# Patient Record
Sex: FEMALE | Race: OTHER | NOT HISPANIC OR LATINO
[De-identification: names, ages, dates, MRNs, and addresses within clinical notes are randomized per-mention and may not be internally consistent; named-entity substitution may affect disease eponyms.]

---

## 2017-05-03 ENCOUNTER — APPOINTMENT (OUTPATIENT)
Dept: VASCULAR SURGERY | Facility: CLINIC | Age: 82
End: 2017-05-03

## 2017-05-03 VITALS — SYSTOLIC BLOOD PRESSURE: 215 MMHG | HEART RATE: 65 BPM | DIASTOLIC BLOOD PRESSURE: 96 MMHG | OXYGEN SATURATION: 96 %

## 2017-05-03 DIAGNOSIS — M79.89 OTHER SPECIFIED SOFT TISSUE DISORDERS: ICD-10-CM

## 2017-05-05 PROBLEM — M79.89 LEG SWELLING: Status: ACTIVE | Noted: 2017-05-05

## 2018-07-20 ENCOUNTER — APPOINTMENT (OUTPATIENT)
Dept: VASCULAR SURGERY | Facility: CLINIC | Age: 83
End: 2018-07-20

## 2018-08-03 ENCOUNTER — APPOINTMENT (OUTPATIENT)
Dept: VASCULAR SURGERY | Facility: CLINIC | Age: 83
End: 2018-08-03

## 2018-11-07 ENCOUNTER — APPOINTMENT (OUTPATIENT)
Dept: VASCULAR SURGERY | Facility: CLINIC | Age: 83
End: 2018-11-07

## 2018-11-09 ENCOUNTER — APPOINTMENT (OUTPATIENT)
Dept: VASCULAR SURGERY | Facility: CLINIC | Age: 83
End: 2018-11-09
Payer: MEDICARE

## 2018-11-09 PROCEDURE — 93970 EXTREMITY STUDY: CPT

## 2018-11-09 PROCEDURE — 99214 OFFICE O/P EST MOD 30 MIN: CPT

## 2019-02-01 ENCOUNTER — APPOINTMENT (OUTPATIENT)
Dept: VASCULAR SURGERY | Facility: CLINIC | Age: 84
End: 2019-02-01

## 2019-12-16 ENCOUNTER — APPOINTMENT (OUTPATIENT)
Dept: PAIN MANAGEMENT | Facility: CLINIC | Age: 84
End: 2019-12-16

## 2020-08-18 ENCOUNTER — APPOINTMENT (OUTPATIENT)
Dept: ORTHOPEDIC SURGERY | Facility: CLINIC | Age: 85
End: 2020-08-18
Payer: MEDICARE

## 2020-08-18 DIAGNOSIS — S76.012A STRAIN OF MUSCLE, FASCIA AND TENDON OF LEFT HIP, INITIAL ENCOUNTER: ICD-10-CM

## 2020-08-18 PROCEDURE — 99203 OFFICE O/P NEW LOW 30 MIN: CPT

## 2020-08-21 NOTE — PHYSICAL EXAM
[LE] : 5/5 motor strength in bilateral lower extremities [PT] : posterior tibial 2+ and symmetric bilaterally [DP] : dorsalis pedis 2+ and symmetric bilaterally [de-identified] : Incisions from IMN are well healed, no erythema or drainage\par Tenderness over greater troch and medius tendon\par Antalgaic gait, subtle trendelenburg\par Ambulates with a walker but able to walk short distances without\par Pain with single leg stance\par Mildly positive log roll\par \par  [de-identified] : Reviewed outside xrays and MRI of the left hip. \par Xrays show short IMN in place with no obvious fracture lines.  Old fracture appears healed.  No collapse of femoral head.\par MRI shows small area of AVN in femoral head, no fracture lines or edema.

## 2020-08-21 NOTE — HISTORY OF PRESENT ILLNESS
[de-identified] : 89yoF with left hip pain.  About a year ago, fell by accident and fractured her hip.  Fixed with a short IMN.  Healed fine but continued to have left hip pain.  Her original surgeon thought she might have sciatica and sent her to see a pain specialist who could do injections for sciatica.  She has gotten multiple rounds of injections and PT with no pain relief.  Her pain physician ordered an MRI of her hip instead and found a small area of AVN in the femoral head without any evidence of collapse.  Referred to see us because of the AVN.  Has significant groin pain as well as lateral hip pain.  Still using a walker for ambulation.  Pain with any ambulation at all.

## 2020-08-21 NOTE — ASSESSMENT
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  We discussed the proposed procedure with her and her daughter and answered all questions.  Discussed risks/benefits and expected outcomes, as well as post-op rehab.  No PT until 5 weeks postop.  She should expect to stay overnight in the hospital.\par \par CASE will benefit from the proposed procedure, she has failed a conservative treatment plan of supervised physical therapy, anti-inflammatory medications, and activity modification. She continues to have pain impacting her ability to perform ADL's and has a decreasing QoL.  We will schedule this for the earliest mutually convenient time after the appropriate pre-op laboratory tests and clearances are obtained.  All questions were answered and a thorough explanation of RBA were given.\par \par All medical record entries made by the PA/Scribe/Fellow are at my, Dr. Cordell Rodríguez's direction and personally dictated by me on 08/18/2020]. I have reviewed the chart and agree that the record accurately reflects my personal performance of the history, physical exam, assessment, and plan. I have also personally directed reviewed, and agreed with the chart.\par

## 2020-09-02 ENCOUNTER — TRANSCRIPTION ENCOUNTER (OUTPATIENT)
Age: 85
End: 2020-09-02

## 2020-09-02 VITALS
OXYGEN SATURATION: 97 % | SYSTOLIC BLOOD PRESSURE: 143 MMHG | HEART RATE: 68 BPM | DIASTOLIC BLOOD PRESSURE: 77 MMHG | HEIGHT: 61 IN | TEMPERATURE: 98 F | WEIGHT: 112.66 LBS | RESPIRATION RATE: 16 BRPM

## 2020-09-02 RX ORDER — INFLUENZA VIRUS VACCINE 15; 15; 15; 15 UG/.5ML; UG/.5ML; UG/.5ML; UG/.5ML
0.5 SUSPENSION INTRAMUSCULAR ONCE
Refills: 0 | Status: DISCONTINUED | OUTPATIENT
Start: 2020-09-03 | End: 2020-09-04

## 2020-09-02 RX ORDER — IRBESARTAN 75 MG/1
1 TABLET ORAL
Qty: 0 | Refills: 0 | DISCHARGE

## 2020-09-02 RX ORDER — POVIDONE-IODINE 5 %
1 AEROSOL (ML) TOPICAL ONCE
Refills: 0 | Status: COMPLETED | OUTPATIENT
Start: 2020-09-03 | End: 2020-09-03

## 2020-09-02 NOTE — H&P ADULT - HISTORY OF PRESENT ILLNESS
89yo m c/o left hip pain x   Presents today for elective LEFT HIP EDVIN, CORE DECOMPRESSION, BONE GRAFT, REPAIR ABDUCTOR MUSCLE. 88yo m c/o left hip pain x since previous injury in April 2019. Patient had fall with hip fracture treated with IMN. She reports persisting pain at the lateral left hip since then. Describes aching pain radiating down the leg. Ambulating with cane as needed. Denies associated numbness/tingling.   Denies recent illness, fevers, chills.   Presents today for elective left hip removal of hardware, core decompression, abductor repair, bone graft with cells with Dr. Rodríguez.

## 2020-09-02 NOTE — H&P ADULT - NSHPPHYSICALEXAM_GEN_ALL_CORE
GENERAL:  PE:  Decreased ROM secondary to pain. Rest of PE per medical clearance. Gen: 88 y/o female, well nourished, well developed, NAD  MSK: Decreased ROM secondary to pain at the left hip   calves soft, nontender bilaterally   sensation intact to light touch bilateral lower extremities  DP 2+,  brisk capillary refill  EHL/FHL/TA/GS 5/5 bilaterally     Rest of PE per MD clearance

## 2020-09-02 NOTE — H&P ADULT - NSHPLABSRESULTS_GEN_ALL_CORE
preop cbc/bmp/coags/ua wnl per medical clearance  EKG- SR with PAC, LAD  echo non severe MR and AI per medical clearance   Preop chest x-ray wnl per clearance   Covid negative 8/31/20

## 2020-09-02 NOTE — H&P ADULT - PROBLEM SELECTOR PLAN 1
Admit to Orthopaedic Service.  Presents today for elective LEFT HIP EDVIN, CORE DECOMPRESSION, BONE GRAFT, REPAIR ABDUCTOR MUSCLE.  Pt medically stable and cleared for procedure today by Dr. Hernandez.

## 2020-09-03 ENCOUNTER — INPATIENT (INPATIENT)
Facility: HOSPITAL | Age: 85
LOS: 0 days | Discharge: HOME | DRG: 481 | End: 2020-09-04
Attending: ORTHOPAEDIC SURGERY | Admitting: ORTHOPAEDIC SURGERY
Payer: MEDICARE

## 2020-09-03 ENCOUNTER — APPOINTMENT (OUTPATIENT)
Dept: ORTHOPEDIC SURGERY | Facility: HOSPITAL | Age: 85
End: 2020-09-03
Payer: MEDICARE

## 2020-09-03 DIAGNOSIS — Z98.890 OTHER SPECIFIED POSTPROCEDURAL STATES: Chronic | ICD-10-CM

## 2020-09-03 DIAGNOSIS — M25.552 PAIN IN LEFT HIP: ICD-10-CM

## 2020-09-03 DIAGNOSIS — F41.9 ANXIETY DISORDER, UNSPECIFIED: ICD-10-CM

## 2020-09-03 DIAGNOSIS — Z90.49 ACQUIRED ABSENCE OF OTHER SPECIFIED PARTS OF DIGESTIVE TRACT: Chronic | ICD-10-CM

## 2020-09-03 DIAGNOSIS — I10 ESSENTIAL (PRIMARY) HYPERTENSION: ICD-10-CM

## 2020-09-03 LAB
BASOPHILS # BLD AUTO: 0.03 K/UL — SIGNIFICANT CHANGE UP (ref 0–0.2)
BASOPHILS NFR BLD AUTO: 0.3 % — SIGNIFICANT CHANGE UP (ref 0–2)
EOSINOPHIL # BLD AUTO: 0.07 K/UL — SIGNIFICANT CHANGE UP (ref 0–0.5)
EOSINOPHIL NFR BLD AUTO: 0.8 % — SIGNIFICANT CHANGE UP (ref 0–6)
HCT VFR BLD CALC: 31.9 % — LOW (ref 34.5–45)
HGB BLD-MCNC: 10.6 G/DL — LOW (ref 11.5–15.5)
IMM GRANULOCYTES NFR BLD AUTO: 0.4 % — SIGNIFICANT CHANGE UP (ref 0–1.5)
LYMPHOCYTES # BLD AUTO: 1.22 K/UL — SIGNIFICANT CHANGE UP (ref 1–3.3)
LYMPHOCYTES # BLD AUTO: 13.2 % — SIGNIFICANT CHANGE UP (ref 13–44)
MCHC RBC-ENTMCNC: 28.6 PG — SIGNIFICANT CHANGE UP (ref 27–34)
MCHC RBC-ENTMCNC: 33.2 GM/DL — SIGNIFICANT CHANGE UP (ref 32–36)
MCV RBC AUTO: 86 FL — SIGNIFICANT CHANGE UP (ref 80–100)
MONOCYTES # BLD AUTO: 0.25 K/UL — SIGNIFICANT CHANGE UP (ref 0–0.9)
MONOCYTES NFR BLD AUTO: 2.7 % — SIGNIFICANT CHANGE UP (ref 2–14)
NEUTROPHILS # BLD AUTO: 7.65 K/UL — HIGH (ref 1.8–7.4)
NEUTROPHILS NFR BLD AUTO: 82.6 % — HIGH (ref 43–77)
NRBC # BLD: 0 /100 WBCS — SIGNIFICANT CHANGE UP (ref 0–0)
PLATELET # BLD AUTO: 253 K/UL — SIGNIFICANT CHANGE UP (ref 150–400)
RBC # BLD: 3.71 M/UL — LOW (ref 3.8–5.2)
RBC # FLD: 13.5 % — SIGNIFICANT CHANGE UP (ref 10.3–14.5)
WBC # BLD: 9.26 K/UL — SIGNIFICANT CHANGE UP (ref 3.8–10.5)
WBC # FLD AUTO: 9.26 K/UL — SIGNIFICANT CHANGE UP (ref 3.8–10.5)

## 2020-09-03 PROCEDURE — 20999 UNLISTED PX MUSCSKEL GENERAL: CPT

## 2020-09-03 PROCEDURE — 27299 UNLISTED PX PELVIS/HIP JOINT: CPT

## 2020-09-03 PROCEDURE — 20680 REMOVAL OF IMPLANT DEEP: CPT | Mod: LT

## 2020-09-03 RX ORDER — FAMOTIDINE 10 MG/ML
20 INJECTION INTRAVENOUS DAILY
Refills: 0 | Status: DISCONTINUED | OUTPATIENT
Start: 2020-09-03 | End: 2020-09-04

## 2020-09-03 RX ORDER — ASPIRIN/CALCIUM CARB/MAGNESIUM 324 MG
81 TABLET ORAL
Refills: 0 | Status: DISCONTINUED | OUTPATIENT
Start: 2020-09-03 | End: 2020-09-04

## 2020-09-03 RX ORDER — TRAMADOL HYDROCHLORIDE 50 MG/1
50 TABLET ORAL EVERY 4 HOURS
Refills: 0 | Status: DISCONTINUED | OUTPATIENT
Start: 2020-09-03 | End: 2020-09-04

## 2020-09-03 RX ORDER — HYDROCHLOROTHIAZIDE 25 MG
12.5 TABLET ORAL ONCE
Refills: 0 | Status: DISCONTINUED | OUTPATIENT
Start: 2020-09-03 | End: 2020-09-03

## 2020-09-03 RX ORDER — KETOROLAC TROMETHAMINE 30 MG/ML
15 SYRINGE (ML) INJECTION EVERY 6 HOURS
Refills: 0 | Status: DISCONTINUED | OUTPATIENT
Start: 2020-09-03 | End: 2020-09-04

## 2020-09-03 RX ORDER — TRAMADOL HYDROCHLORIDE 50 MG/1
25 TABLET ORAL EVERY 4 HOURS
Refills: 0 | Status: DISCONTINUED | OUTPATIENT
Start: 2020-09-03 | End: 2020-09-04

## 2020-09-03 RX ORDER — MORPHINE SULFATE 50 MG/1
4 CAPSULE, EXTENDED RELEASE ORAL EVERY 4 HOURS
Refills: 0 | Status: DISCONTINUED | OUTPATIENT
Start: 2020-09-03 | End: 2020-09-04

## 2020-09-03 RX ORDER — SODIUM CHLORIDE 9 MG/ML
1000 INJECTION, SOLUTION INTRAVENOUS
Refills: 0 | Status: DISCONTINUED | OUTPATIENT
Start: 2020-09-03 | End: 2020-09-04

## 2020-09-03 RX ORDER — AMLODIPINE BESYLATE 2.5 MG/1
2.5 TABLET ORAL DAILY
Refills: 0 | Status: DISCONTINUED | OUTPATIENT
Start: 2020-09-03 | End: 2020-09-03

## 2020-09-03 RX ORDER — MORPHINE SULFATE 50 MG/1
4 CAPSULE, EXTENDED RELEASE ORAL
Refills: 0 | Status: DISCONTINUED | OUTPATIENT
Start: 2020-09-03 | End: 2020-09-04

## 2020-09-03 RX ORDER — MAGNESIUM HYDROXIDE 400 MG/1
30 TABLET, CHEWABLE ORAL DAILY
Refills: 0 | Status: DISCONTINUED | OUTPATIENT
Start: 2020-09-03 | End: 2020-09-04

## 2020-09-03 RX ORDER — POLYETHYLENE GLYCOL 3350 17 G/17G
17 POWDER, FOR SOLUTION ORAL DAILY
Refills: 0 | Status: DISCONTINUED | OUTPATIENT
Start: 2020-09-03 | End: 2020-09-04

## 2020-09-03 RX ORDER — ONDANSETRON 8 MG/1
4 TABLET, FILM COATED ORAL EVERY 6 HOURS
Refills: 0 | Status: DISCONTINUED | OUTPATIENT
Start: 2020-09-03 | End: 2020-09-04

## 2020-09-03 RX ORDER — CEFAZOLIN SODIUM 1 G
2000 VIAL (EA) INJECTION EVERY 8 HOURS
Refills: 0 | Status: COMPLETED | OUTPATIENT
Start: 2020-09-03 | End: 2020-09-03

## 2020-09-03 RX ORDER — SENNA PLUS 8.6 MG/1
2 TABLET ORAL AT BEDTIME
Refills: 0 | Status: DISCONTINUED | OUTPATIENT
Start: 2020-09-03 | End: 2020-09-04

## 2020-09-03 RX ORDER — CHLORHEXIDINE GLUCONATE 213 G/1000ML
1 SOLUTION TOPICAL ONCE
Refills: 0 | Status: COMPLETED | OUTPATIENT
Start: 2020-09-03 | End: 2020-09-03

## 2020-09-03 RX ADMIN — MAGNESIUM HYDROXIDE 30 MILLILITER(S): 400 TABLET, CHEWABLE ORAL at 23:28

## 2020-09-03 RX ADMIN — Medication 1 TABLET(S): at 13:51

## 2020-09-03 RX ADMIN — MORPHINE SULFATE 4 MILLIGRAM(S): 50 CAPSULE, EXTENDED RELEASE ORAL at 16:10

## 2020-09-03 RX ADMIN — Medication 15 MILLIGRAM(S): at 13:40

## 2020-09-03 RX ADMIN — SODIUM CHLORIDE 125 MILLILITER(S): 9 INJECTION, SOLUTION INTRAVENOUS at 18:16

## 2020-09-03 RX ADMIN — Medication 15 MILLIGRAM(S): at 23:45

## 2020-09-03 RX ADMIN — TRAMADOL HYDROCHLORIDE 50 MILLIGRAM(S): 50 TABLET ORAL at 14:40

## 2020-09-03 RX ADMIN — Medication 100 MILLIGRAM(S): at 23:28

## 2020-09-03 RX ADMIN — Medication 81 MILLIGRAM(S): at 18:16

## 2020-09-03 RX ADMIN — Medication 15 MILLIGRAM(S): at 23:28

## 2020-09-03 RX ADMIN — CHLORHEXIDINE GLUCONATE 1 APPLICATION(S): 213 SOLUTION TOPICAL at 07:25

## 2020-09-03 RX ADMIN — MORPHINE SULFATE 4 MILLIGRAM(S): 50 CAPSULE, EXTENDED RELEASE ORAL at 15:29

## 2020-09-03 RX ADMIN — FAMOTIDINE 20 MILLIGRAM(S): 10 INJECTION INTRAVENOUS at 15:19

## 2020-09-03 RX ADMIN — TRAMADOL HYDROCHLORIDE 50 MILLIGRAM(S): 50 TABLET ORAL at 13:53

## 2020-09-03 RX ADMIN — Medication 1 APPLICATION(S): at 07:26

## 2020-09-03 RX ADMIN — Medication 15 MILLIGRAM(S): at 12:47

## 2020-09-03 RX ADMIN — Medication 30 MILLILITER(S): at 16:33

## 2020-09-03 RX ADMIN — Medication 15 MILLIGRAM(S): at 18:16

## 2020-09-03 RX ADMIN — Medication 15 MILLIGRAM(S): at 19:00

## 2020-09-03 RX ADMIN — Medication 1 TABLET(S): at 23:28

## 2020-09-03 RX ADMIN — ONDANSETRON 4 MILLIGRAM(S): 8 TABLET, FILM COATED ORAL at 18:55

## 2020-09-03 RX ADMIN — Medication 100 MILLIGRAM(S): at 15:19

## 2020-09-03 NOTE — CONSULT NOTE ADULT - ASSESSMENT
89F well known to Dr. 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    - patient does not currently require intervention for varicose veins  - she is in good spirits and recovering well post-operatively  - vascular surgery service will follow

## 2020-09-03 NOTE — PROGRESS NOTE ADULT - SUBJECTIVE AND OBJECTIVE BOX
Orthopedics Post Op Check    Procedure: LEFT HIP EDVIN, CORE DECOMPRESSION, ABDUCTOR MUSCLE REPAIR  Surgeon: JUWAN    Pt. stable eating in bed with family at bedside, pt. very inquisitive of what happened in the OR. Pt. reassured team will communicate about operation. Denies CP, SOB, N/V, numbness/tingling in b/l les.    Vital Signs Last 24 Hrs  T(C): 36.6 (03 Sep 2020 15:27), Max: 36.7 (03 Sep 2020 11:58)  T(F): 97.8 (03 Sep 2020 15:27), Max: 98 (03 Sep 2020 11:58)  HR: 96 (03 Sep 2020 15:27) (68 - 96)  BP: 123/73 (03 Sep 2020 15:27) (121/67 - 172/74)  BP(mean): 93 (03 Sep 2020 11:58) (93 - 106)  RR: 15 (03 Sep 2020 15:27) (15 - 17)  SpO2: 95% (03 Sep 2020 15:27) (86% - 99%)      Dressing C/D/I    Pulses: DP/PT 2+ B/L LES  SLT: INTACT  B/L LES  Motor:  EHL/FHL/TA/GS  5/5 B/L LES                          10.6   9.26  )-----------( 253      ( 03 Sep 2020 11:21 )             31.9         A/P: 89yoFemale POD#0 s/p LEFT HIP EDVIN, CORE DECOMPRESSION, ABDUCTOR MUSCLE REPAIR  - Stable  - Pain Control  - DVT ppx: ASA 81 BID  - Post op abx: ANCEF  - PT, WBS: PWB LEFT LE  - F/U AM Labs

## 2020-09-03 NOTE — BRIEF OPERATIVE NOTE - NSICDXBRIEFPROCEDURE_GEN_ALL_CORE_FT
PROCEDURES:  Removal of hardware from hip from previous open reduction and internal fixation (ORIF) 03-Sep-2020 07:32:55  Wai Bennett

## 2020-09-04 ENCOUNTER — TRANSCRIPTION ENCOUNTER (OUTPATIENT)
Age: 85
End: 2020-09-04

## 2020-09-04 VITALS
RESPIRATION RATE: 17 BRPM | HEART RATE: 91 BPM | TEMPERATURE: 99 F | DIASTOLIC BLOOD PRESSURE: 64 MMHG | OXYGEN SATURATION: 90 % | SYSTOLIC BLOOD PRESSURE: 127 MMHG

## 2020-09-04 LAB
ANION GAP SERPL CALC-SCNC: 7 MMOL/L — SIGNIFICANT CHANGE UP (ref 5–17)
ANION GAP SERPL CALC-SCNC: 8 MMOL/L — SIGNIFICANT CHANGE UP (ref 5–17)
APPEARANCE UR: CLEAR — SIGNIFICANT CHANGE UP
BILIRUB UR-MCNC: NEGATIVE — SIGNIFICANT CHANGE UP
BUN SERPL-MCNC: 20 MG/DL — SIGNIFICANT CHANGE UP (ref 7–23)
BUN SERPL-MCNC: 24 MG/DL — HIGH (ref 7–23)
CALCIUM SERPL-MCNC: 8.5 MG/DL — SIGNIFICANT CHANGE UP (ref 8.4–10.5)
CALCIUM SERPL-MCNC: 8.6 MG/DL — SIGNIFICANT CHANGE UP (ref 8.4–10.5)
CHLORIDE SERPL-SCNC: 93 MMOL/L — LOW (ref 96–108)
CHLORIDE SERPL-SCNC: 93 MMOL/L — LOW (ref 96–108)
CO2 SERPL-SCNC: 29 MMOL/L — SIGNIFICANT CHANGE UP (ref 22–31)
CO2 SERPL-SCNC: 31 MMOL/L — SIGNIFICANT CHANGE UP (ref 22–31)
COLOR SPEC: YELLOW — SIGNIFICANT CHANGE UP
CREAT SERPL-MCNC: 0.75 MG/DL — SIGNIFICANT CHANGE UP (ref 0.5–1.3)
CREAT SERPL-MCNC: 0.81 MG/DL — SIGNIFICANT CHANGE UP (ref 0.5–1.3)
DIFF PNL FLD: NEGATIVE — SIGNIFICANT CHANGE UP
GLUCOSE SERPL-MCNC: 121 MG/DL — HIGH (ref 70–99)
GLUCOSE SERPL-MCNC: 133 MG/DL — HIGH (ref 70–99)
GLUCOSE UR QL: NEGATIVE — SIGNIFICANT CHANGE UP
HCT VFR BLD CALC: 25.6 % — LOW (ref 34.5–45)
HCT VFR BLD CALC: 27 % — LOW (ref 34.5–45)
HGB BLD-MCNC: 8.4 G/DL — LOW (ref 11.5–15.5)
HGB BLD-MCNC: 8.7 G/DL — LOW (ref 11.5–15.5)
KETONES UR-MCNC: NEGATIVE — SIGNIFICANT CHANGE UP
LEUKOCYTE ESTERASE UR-ACNC: ABNORMAL
MCHC RBC-ENTMCNC: 28.3 PG — SIGNIFICANT CHANGE UP (ref 27–34)
MCHC RBC-ENTMCNC: 28.4 PG — SIGNIFICANT CHANGE UP (ref 27–34)
MCHC RBC-ENTMCNC: 32.2 GM/DL — SIGNIFICANT CHANGE UP (ref 32–36)
MCHC RBC-ENTMCNC: 32.8 GM/DL — SIGNIFICANT CHANGE UP (ref 32–36)
MCV RBC AUTO: 86.5 FL — SIGNIFICANT CHANGE UP (ref 80–100)
MCV RBC AUTO: 87.9 FL — SIGNIFICANT CHANGE UP (ref 80–100)
NITRITE UR-MCNC: NEGATIVE — SIGNIFICANT CHANGE UP
NRBC # BLD: 0 /100 WBCS — SIGNIFICANT CHANGE UP (ref 0–0)
NRBC # BLD: 0 /100 WBCS — SIGNIFICANT CHANGE UP (ref 0–0)
PH UR: 6.5 — SIGNIFICANT CHANGE UP (ref 5–8)
PLATELET # BLD AUTO: 220 K/UL — SIGNIFICANT CHANGE UP (ref 150–400)
PLATELET # BLD AUTO: 222 K/UL — SIGNIFICANT CHANGE UP (ref 150–400)
POTASSIUM SERPL-MCNC: 4.8 MMOL/L — SIGNIFICANT CHANGE UP (ref 3.5–5.3)
POTASSIUM SERPL-MCNC: 5.5 MMOL/L — HIGH (ref 3.5–5.3)
POTASSIUM SERPL-SCNC: 4.8 MMOL/L — SIGNIFICANT CHANGE UP (ref 3.5–5.3)
POTASSIUM SERPL-SCNC: 5.5 MMOL/L — HIGH (ref 3.5–5.3)
PROT UR-MCNC: NEGATIVE MG/DL — SIGNIFICANT CHANGE UP
RBC # BLD: 2.96 M/UL — LOW (ref 3.8–5.2)
RBC # BLD: 3.07 M/UL — LOW (ref 3.8–5.2)
RBC # FLD: 13.6 % — SIGNIFICANT CHANGE UP (ref 10.3–14.5)
RBC # FLD: 13.7 % — SIGNIFICANT CHANGE UP (ref 10.3–14.5)
SODIUM SERPL-SCNC: 130 MMOL/L — LOW (ref 135–145)
SODIUM SERPL-SCNC: 131 MMOL/L — LOW (ref 135–145)
SP GR SPEC: 1.01 — SIGNIFICANT CHANGE UP (ref 1–1.03)
UROBILINOGEN FLD QL: 0.2 E.U./DL — SIGNIFICANT CHANGE UP
WBC # BLD: 8.68 K/UL — SIGNIFICANT CHANGE UP (ref 3.8–10.5)
WBC # BLD: 9.35 K/UL — SIGNIFICANT CHANGE UP (ref 3.8–10.5)
WBC # FLD AUTO: 8.68 K/UL — SIGNIFICANT CHANGE UP (ref 3.8–10.5)
WBC # FLD AUTO: 9.35 K/UL — SIGNIFICANT CHANGE UP (ref 3.8–10.5)

## 2020-09-04 PROCEDURE — 86901 BLOOD TYPING SEROLOGIC RH(D): CPT

## 2020-09-04 PROCEDURE — 80048 BASIC METABOLIC PNL TOTAL CA: CPT

## 2020-09-04 PROCEDURE — 85025 COMPLETE CBC W/AUTO DIFF WBC: CPT

## 2020-09-04 PROCEDURE — 36415 COLL VENOUS BLD VENIPUNCTURE: CPT

## 2020-09-04 PROCEDURE — C1713: CPT

## 2020-09-04 PROCEDURE — 99223 1ST HOSP IP/OBS HIGH 75: CPT

## 2020-09-04 PROCEDURE — 85027 COMPLETE CBC AUTOMATED: CPT

## 2020-09-04 PROCEDURE — 97161 PT EVAL LOW COMPLEX 20 MIN: CPT

## 2020-09-04 PROCEDURE — 76000 FLUOROSCOPY <1 HR PHYS/QHP: CPT

## 2020-09-04 PROCEDURE — 81001 URINALYSIS AUTO W/SCOPE: CPT

## 2020-09-04 PROCEDURE — 86850 RBC ANTIBODY SCREEN: CPT

## 2020-09-04 RX ORDER — ASPIRIN/CALCIUM CARB/MAGNESIUM 324 MG
1 TABLET ORAL
Qty: 56 | Refills: 0
Start: 2020-09-04 | End: 2020-10-01

## 2020-09-04 RX ORDER — IRBESARTAN 75 MG/1
150 TABLET ORAL AT BEDTIME
Refills: 0 | Status: DISCONTINUED | OUTPATIENT
Start: 2020-09-04 | End: 2020-09-04

## 2020-09-04 RX ORDER — PANTOPRAZOLE SODIUM 20 MG/1
40 TABLET, DELAYED RELEASE ORAL
Refills: 0 | Status: DISCONTINUED | OUTPATIENT
Start: 2020-09-04 | End: 2020-09-04

## 2020-09-04 RX ORDER — DIPHENHYDRAMINE HCL 50 MG
12.5 CAPSULE ORAL EVERY 6 HOURS
Refills: 0 | Status: DISCONTINUED | OUTPATIENT
Start: 2020-09-04 | End: 2020-09-04

## 2020-09-04 RX ORDER — TRAMADOL HYDROCHLORIDE 50 MG/1
1 TABLET ORAL
Qty: 28 | Refills: 0
Start: 2020-09-04 | End: 2020-09-10

## 2020-09-04 RX ORDER — AMLODIPINE BESYLATE 2.5 MG/1
2.5 TABLET ORAL
Refills: 0 | Status: DISCONTINUED | OUTPATIENT
Start: 2020-09-04 | End: 2020-09-04

## 2020-09-04 RX ORDER — LOSARTAN POTASSIUM 100 MG/1
50 TABLET, FILM COATED ORAL DAILY
Refills: 0 | Status: DISCONTINUED | OUTPATIENT
Start: 2020-09-04 | End: 2020-09-04

## 2020-09-04 RX ORDER — HYDROCHLOROTHIAZIDE 25 MG
12.5 TABLET ORAL DAILY
Refills: 0 | Status: DISCONTINUED | OUTPATIENT
Start: 2020-09-04 | End: 2020-09-04

## 2020-09-04 RX ORDER — ASPIRIN/CALCIUM CARB/MAGNESIUM 324 MG
1 TABLET ORAL
Qty: 0 | Refills: 0 | DISCHARGE

## 2020-09-04 RX ADMIN — TRAMADOL HYDROCHLORIDE 25 MILLIGRAM(S): 50 TABLET ORAL at 07:52

## 2020-09-04 RX ADMIN — ONDANSETRON 4 MILLIGRAM(S): 8 TABLET, FILM COATED ORAL at 01:09

## 2020-09-04 RX ADMIN — Medication 15 MILLIGRAM(S): at 05:36

## 2020-09-04 RX ADMIN — Medication 15 MILLIGRAM(S): at 06:00

## 2020-09-04 RX ADMIN — Medication 1 TABLET(S): at 05:36

## 2020-09-04 RX ADMIN — Medication 12.5 MILLIGRAM(S): at 12:01

## 2020-09-04 RX ADMIN — TRAMADOL HYDROCHLORIDE 25 MILLIGRAM(S): 50 TABLET ORAL at 07:47

## 2020-09-04 RX ADMIN — Medication 81 MILLIGRAM(S): at 05:36

## 2020-09-04 NOTE — PROGRESS NOTE ADULT - SUBJECTIVE AND OBJECTIVE BOX
DAILY PROGRESS NOTE:    S: Patient feeling well, pain greatly improved in hip.    O:    Vital Signs Last 24 Hrs  T(C): 36.3 (04 Sep 2020 05:19), Max: 36.7 (03 Sep 2020 11:58)  T(F): 97.4 (04 Sep 2020 05:19), Max: 98 (03 Sep 2020 11:58)  HR: 74 (04 Sep 2020 05:19) (68 - 96)  BP: 113/64 (04 Sep 2020 05:19) (113/64 - 172/74)  BP(mean): 93 (03 Sep 2020 11:58) (93 - 106)  RR: 16 (04 Sep 2020 05:19) (15 - 17)  SpO2: 95% (04 Sep 2020 05:19) (86% - 99%)    I&O's Detail    03 Sep 2020 07:01  -  04 Sep 2020 07:00  --------------------------------------------------------  IN:    lactated ringers.: 875 mL    Oral Fluid: 600 mL  Total IN: 1475 mL    OUT:    Intermittent Catheterization - Urethral: 100 mL    Voided: 550 mL  Total OUT: 650 mL    Total NET: 825 mL        General: NAD  Pulm: normal resp effort and excursion  Abd: soft, NT/ND  Ext: WWP bilaterally, no edema, L hip w/ dressing CDI, palpable DP and PT pulses bilaterally    LABS:                        10.6   9.26  )-----------( 253      ( 03 Sep 2020 11:21 )             31.9                 RADIOLOGY & ADDITIONAL STUDIES:

## 2020-09-04 NOTE — PHYSICAL THERAPY INITIAL EVALUATION ADULT - GENERAL OBSERVATIONS, REHAB EVAL
Pt received semi supine, +L jip incision bandage C/D/I, +heplock, +bilateral SCDs, NAD, agreeable to PT.

## 2020-09-04 NOTE — PROGRESS NOTE ADULT - ASSESSMENT
89F well known to Dr. 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    - patient does not currently require intervention for varicose veins this hospitalization   - recovering well post-operatively  - vascular surgery service will follow

## 2020-09-04 NOTE — PHYSICAL THERAPY INITIAL EVALUATION ADULT - CRITERIA FOR SKILLED THERAPEUTIC INTERVENTIONS
functional limitations in following categories/anticipated equipment needs at discharge/anticipated discharge recommendation/impairments found

## 2020-09-04 NOTE — PROGRESS NOTE ADULT - ASSESSMENT
89F s/p L hip EDVIN and core decompression, POD #1    Plan:  -WBAT  -pain control  -incentive spirometry  -DVT ppx  -PT/OT  -f/u am labs  -dispo planning Agree with MS4  assessment above  Pt seen and examined independently by resident @ 635am  Vitals stable  NVI as above  labs stable  Plan:   Pain control, ASA, cont PT, appreciate consult recs, Dispo- Home    Rhett Khan MD  Chief Orthopaedic Resident  Orthopaedic Surgery No

## 2020-09-04 NOTE — DISCHARGE NOTE PROVIDER - NSDCCPTREATMENT_GEN_ALL_CORE_FT
PRINCIPAL PROCEDURE  Procedure: Removal of hardware from hip from previous open reduction and internal fixation (ORIF)  Findings and Treatment:

## 2020-09-04 NOTE — PHYSICAL THERAPY INITIAL EVALUATION ADULT - PERTINENT HX OF CURRENT PROBLEM, REHAB EVAL
89yo m c/o left hip pain x since previous injury in April 2019. Patient had fall with hip fracture treated with IMN. She reports persisting pain at the lateral left hip since then. Describes aching pain radiating down the leg.

## 2020-09-04 NOTE — DISCHARGE NOTE PROVIDER - NSDCMRMEDTOKEN_GEN_ALL_CORE_FT
amLODIPine 2.5 mg oral tablet: 1 tab(s) orally once a day  aspirin 81 mg oral delayed release tablet: 1 tab(s) orally 2 times a day for 4 weeks  hydroCHLOROthiazide 12.5 mg oral tablet: 1 tab(s) orally once a day  irbesartan 150 mg oral tablet: 1 tab(s) orally once a day  traMADol 50 mg oral tablet: 0.5- 1 tab orally every 6 hours as needed for moderate to severe pain MDD:4 amLODIPine 2.5 mg oral tablet: 1 tab(s) orally once a day  aspirin 81 mg oral delayed release tablet: 1 tab(s) orally 2 times a day for 4 weeks  hydroCHLOROthiazide 12.5 mg oral tablet: 1 tab(s) orally once a day  irbesartan 150 mg oral tablet: 1 tab(s) orally once a day  physical therapy: 3x/ week   50% protected weight bearing RLE    s/p right hip removal of hardware and core decompression  traMADol 50 mg oral tablet: 0.5- 1 tab orally every 6 hours as needed for moderate to severe pain MDD:4 amLODIPine 2.5 mg oral tablet: 1 tab(s) orally once a day  aspirin 81 mg oral delayed release tablet: 1 tab(s) orally 2 times a day for 4 weeks  irbesartan 150 mg oral tablet: 1 tab(s) orally once a day  traMADol 50 mg oral tablet: 0.5- 1 tab orally every 6 hours as needed for moderate to severe pain MDD:4

## 2020-09-04 NOTE — PROGRESS NOTE ADULT - SUBJECTIVE AND OBJECTIVE BOX
ORTHO CO-MGMT CONSULT    HPI:  89yo woman with a PMH of HTN, osteoporosis and anxiety who p/w acute on chronic left hip pain since a previous injury in April 2019.  Patient had fall with hip fracture treated with IMN.  She reports persisting pain at the lateral left hip since then.  Describes aching pain radiating down the leg.  Ambulating with cane as needed. Denies associated numbness/tingling.  Denies recent illness, fevers, chills.  She presented on 9/2 for elective left hip removal of hardware, core decompression, abductor repair and bone graft with cells with Dr. Rodríguez.    Now on POD #1.  NAEO.  Pt complaining of itching from her Tramadol.  Denies throat swelling or trouble breathing but thinks she's having hives.  Also notes some pinkish discoloration beneath her eyes.     PAST MEDICAL & SURGICAL HISTORY:  Anxiety  Osteoporosis  HTN (hypertension)  H/O eye surgery  History of hip surgery  History of appendectomy    Home Meds: Home Medications:  amLODIPine 2.5 mg oral tablet: 1 tab(s) orally once a day (02 Sep 2020 15:26)  aspirin 81 mg oral delayed release tablet: 1 tab(s) orally once a day (02 Sep 2020 15:26)  hydroCHLOROthiazide 12.5 mg oral tablet: 1 tab(s) orally once a day (02 Sep 2020 15:26)  irbesartan 150 mg oral tablet: 1 tab(s) orally once a day (02 Sep 2020 15:26)    Allergies: Allergies  carvedilol (Unknown)  codeine (Unknown)  conjugated estrogens (Unknown)  doxycycline (Unknown)  Levaquin (Unknown)  lubiprostone (Unknown)  nitrofurantoin (Unknown)  nortriptyline (Unknown)  1.	penicillin (Unknown)  Tylenol (Rash)    CURRENT MEDICATIONS:  MEDICATIONS  (STANDING):  aspirin enteric coated 81 milliGRAM(s) Oral two times a day  calcium carbonate 1250 mG  + Vitamin D (OsCal 500 + D) 1 Tablet(s) Oral three times a day  famotidine    Tablet 20 milliGRAM(s) Oral daily  influenza   Vaccine 0.5 milliLiter(s) IntraMuscular once  ketorolac   Injectable 15 milliGRAM(s) IV Push every 6 hours  lactated ringers. 1000 milliLiter(s) (125 mL/Hr) IV Continuous <Continuous>  morphine  - Injectable 4 milliGRAM(s) IV Push every 15 minutes  polyethylene glycol 3350 17 Gram(s) Oral daily    MEDICATIONS  (PRN):  aluminum hydroxide/magnesium hydroxide/simethicone Suspension 30 milliLiter(s) Oral four times a day PRN Indigestion  diphenhydrAMINE 12.5 milliGRAM(s) Oral every 6 hours PRN Rash and/or Itching  magnesium hydroxide Suspension 30 milliLiter(s) Oral daily PRN Constipation  morphine  - Injectable 4 milliGRAM(s) IV Push every 4 hours PRN breakthrough pain  ondansetron Injectable 4 milliGRAM(s) IV Push every 6 hours PRN Nausea and/or Vomiting  senna 2 Tablet(s) Oral at bedtime PRN Constipation  traMADol 25 milliGRAM(s) Oral every 4 hours PRN Moderate Pain (4 - 6)  traMADol 50 milliGRAM(s) Oral every 4 hours PRN Severe Pain (7 - 10)      OBJECTIVE:  Vital Signs Last 24 Hrs  T(C): 36.4 (04 Sep 2020 08:47), Max: 36.7 (03 Sep 2020 11:58)  T(F): 97.6 (04 Sep 2020 08:47), Max: 98 (03 Sep 2020 11:58)  HR: 79 (04 Sep 2020 08:47) (74 - 96)  BP: 135/67 (04 Sep 2020 08:47) (113/64 - 151/74)  BP(mean): 93 (03 Sep 2020 11:58) (93 - 93)  RR: 16 (04 Sep 2020 08:47) (15 - 17)  SpO2: 94% (04 Sep 2020 08:47) (94% - 99%)  I&O's Summary    03 Sep 2020 07:01  -  04 Sep 2020 07:00  --------------------------------------------------------  IN: 1475 mL / OUT: 650 mL / NET: 825 mL    04 Sep 2020 07:01  -  04 Sep 2020 11:45  --------------------------------------------------------  IN: 240 mL / OUT: 400 mL / NET: -160 mL    PHYSICAL EXAM:  Gen: NAD, sitting upright in bed and speaking in full sentences but mildly anxious  HEENT: NCAT, MMM, clear OP, no appreciate erythema or swelling  CV: RRR, no m/g/r appreciated  Pulm: CTA B, no w/r/r; no increase in WOB  Abd: normoactive BS, soft, NTND  Ext: WWP, 2+ pulses x4; no c/c/e; L hip incision c/d/i  Skin: pink discoloration between eyes but no hives/wheals/papules appreciated on remainder of FBSE  Neuro: AOx3, nonfocal  Psych: anxious but conversant and appropriate    LABS:                        8.7    9.35  )-----------( 222      ( 04 Sep 2020 07:52 )             27.0     09-04    131<L>  |  93<L>  |  24<H>  ----------------------------<  121<H>  5.5<H>   |  31  |  0.81    Ca    8.6      04 Sep 2020 07:51    MICRODATA:  -- No new microdata.     RADIOLOGY/OTHER STUDIES:  -- No new imaging.

## 2020-09-04 NOTE — DISCHARGE NOTE PROVIDER - NSDCACTIVITY_GEN_ALL_CORE
Do not drive or operate machinery/Showering allowed/No heavy lifting/straining/Walking - Indoors allowed

## 2020-09-04 NOTE — PROGRESS NOTE ADULT - ASSESSMENT
In summary, this is a 89yo woman with a PMH of HTN, osteoporosis and anxiety who p/w acute on chronic left hip pain now on POD #1, s/p elective left hip removal of hardware, core decompression, abductor repair and bone graft with cells with Dr. Rodríguez.  Post-op course notable for anemia, hyperkalemia and questionable adverse reaction to Tramadol.     #Drug Reaction?  -- subjectively c/o reaction to Tramadol but no objective findings  -- agree with Benadryl for now  -- would hold Tramadol and offer alternative analgesic     #Post-op Anemia  -- presumed 2/2 surgery   -- no signs of ongoing blood loss  -- trend Hb with daily CBC     #Hyperkalemia  -- no on any offending agents  -- would stop LR and switch to NS  -- would re-check BMP in the PM and treat hyperK if still above 5 with Lokelma     #HTN  -- continue to hold Norvasc, HCTZ and Ibersartan     Remainder of plan as per primary team.    Dannielle Estes MD  Hospitalist Attending  [phone]

## 2020-09-04 NOTE — PROGRESS NOTE ADULT - SUBJECTIVE AND OBJECTIVE BOX
Ortho Note    Pt comfortable without complaints, pain controlled  Denies CP, SOB, N/V, numbness/tingling     Vital Signs Last 24 Hrs  T(C): 36.4 (09-04-20 @ 08:47), Max: 36.4 (09-04-20 @ 08:47)  T(F): 97.6 (09-04-20 @ 08:47), Max: 97.6 (09-04-20 @ 08:47)  HR: 79 (09-04-20 @ 08:47) (79 - 79)  BP: 135/67 (09-04-20 @ 08:47) (135/67 - 135/67)  BP(mean): --  RR: 16 (09-04-20 @ 08:47) (16 - 16)  SpO2: 94% (09-04-20 @ 08:47) (94% - 94%)  AVSS    focused exam:  General: Pt Alert and oriented, NAD  DSG C/D/I  Pulses: +2DP, WWP feet  Sensation: SILT BLE  Motor: 5/5 EHL/FHL/TA/GS                          8.7    9.35  )-----------( 222      ( 04 Sep 2020 07:52 )             27.0   04 Sep 2020 07:51    131    |  93     |  24     ----------------------------<  121    5.5     |  31     |  0.81     Ca    8.6        04 Sep 2020 07:51        A/P: 89yFemale POD#1 s/p R hip EDVIN, CD  - hyponatremia- hold hctz; asymtpmatic- repeat BMP at 2pm  - hyperkalemia- hold irbesartan today, repeat BMP at 2pm  - urinary retention/ frequency- Urination improving as per patient;  UA and bladder scan ordered to r/o UTI and obtain PVR  - Pain Control  - DVT ppx: ASA  - PT, WBS: PWB 50% RLE  - OOB for meals, I/S  - continue bowel regimen  - patient refusing hospital meds; meds obtained from daughter and verified by pharmacy  - dispo: home with HPT pending PM labs and PT clearance    Ortho Pager [phone]

## 2020-09-04 NOTE — DISCHARGE NOTE NURSING/CASE MANAGEMENT/SOCIAL WORK - PATIENT PORTAL LINK FT
You can access the FollowMyHealth Patient Portal offered by [facility] by registering at the following website: [email or web address]. By joining [facility]’s FollowMyHealth portal, you will also be able to view your health information using other applications (apps) compatible with our system.

## 2020-09-04 NOTE — DISCHARGE NOTE PROVIDER - NSDCFUADDINST_GEN_ALL_CORE_FT
50% protected weight on operated hip using a rolling walker    No strenuous activity, heavy lifting, driving or returning to work until cleared by MD.  You may shower - dressing is water-resistant, no soaking in bathtubs.  Keep dressing on your hip until the follow up appointment.  Try to have regular bowel movements, take stool softener or laxative if necessary.  May take Pepcid or Zantac for upset stomach.  Swelling may travel all the way down leg to foot, this is normal and will subside in a few weeks.  Call to schedule an appt with Dr. Rodríguez for follow up.    Contact your doctor if you experience: fever greater than 101.5, chills, chest pain, difficulty breathing, redness or excessive drainage around the incision, other concerns.  Follow up with your primary care provider. 50% protected weight on operated hip using a rolling walker. Your physical therapy for the first 5 weeks should only consist of functional needs assessment in home and ambulation.    No strenuous activity, heavy lifting, driving or returning to work until cleared by MD.  You may shower - dressing is water-resistant, no soaking in bathtubs.  Keep dressing on your hip until the follow up appointment.  Try to have regular bowel movements, take stool softener or laxative if necessary.  May take Pepcid or Zantac for upset stomach.  Swelling may travel all the way down leg to foot, this is normal and will subside in a few weeks.  Call to schedule an appt with Dr. Rodríguez for follow up.    Contact your doctor if you experience: fever greater than 101.5, chills, chest pain, difficulty breathing, redness or excessive drainage around the incision, other concerns.  Follow up with your primary care provider. You have hyponatremia (low blood sodium). Pre-operatively, your sodium level was 132. During your hospital stay your sodium level was 130. You should follow-up with your primary care doctor within 1 week of discharge for repeat BMP (blood test) to check your sodium levels. You should stop taking your hydrochlorothiazide until you follow-up with your primary care doctor, because this medication can further lower your sodium levels. Continue to take your other home medications.    50% protected weight on operated hip using a rolling walker. Your physical therapy for the first 5 weeks should only consist of functional needs assessment in home and ambulation.    No strenuous activity, heavy lifting, driving or returning to work until cleared by MD.  You may shower - dressing is water-resistant, no soaking in bathtubs.  Keep dressing on your hip until the follow up appointment.  Try to have regular bowel movements, take stool softener or laxative if necessary.  May take Pepcid or Zantac for upset stomach.  Swelling may travel all the way down leg to foot, this is normal and will subside in a few weeks.  Call to schedule an appt with Dr. Rodríguez for follow up.    Contact your doctor if you experience: fever greater than 101.5, chills, chest pain, difficulty breathing, redness or excessive drainage around the incision, other concerns.  Follow up with your primary care provider.

## 2020-09-04 NOTE — DISCHARGE NOTE PROVIDER - HOSPITAL COURSE
Admitted 9/3/20    Surgery- right hip removal of hardware and core decompression    Janay-op Antibiotics    Pain control    DVT prophylaxis    OOB/Physical Therapy

## 2020-09-04 NOTE — PHYSICAL THERAPY INITIAL EVALUATION ADULT - ADDITIONAL COMMENTS
Pt lives alone in a house with 3 TORY and a chair lift on the inside. At baseline, ambulates independently with RW and is very active. Daughter lives nearby and can provide 24hr assist as needed.

## 2020-09-04 NOTE — DISCHARGE NOTE PROVIDER - CARE PROVIDER_API CALL
Cordell Rodríguez)  Orthopaedic Surgery  [address]  [place], [place] [address]  Phone: [phone]  Fax: [phone]  Follow Up Time: 2 weeks

## 2020-09-04 NOTE — DISCHARGE NOTE PROVIDER - NSDCHHNEEDSERVICE_GEN_ALL_CORE
Observation and assessment/Medication teaching and assessment/Rehabilitation services/Teaching and training/Wound care and assessment

## 2020-09-04 NOTE — PHYSICAL THERAPY INITIAL EVALUATION ADULT - DIAGNOSIS, PT EVAL
Impaired Joint Mobility, Motor Function, Muscle Performance, and Range of Motion Associated with Bony or Soft Tissue Surgery
constant

## 2020-09-04 NOTE — PROGRESS NOTE ADULT - SUBJECTIVE AND OBJECTIVE BOX
ORTHO PROGRESS NOTE    Pt seen and examined at bedside this am. Doing well, pain controlled. No acute events overnight.    Vital Signs Last 24 Hrs  T(C): 36.3 (04 Sep 2020 05:19), Max: 36.7 (03 Sep 2020 11:58)  T(F): 97.4 (04 Sep 2020 05:19), Max: 98 (03 Sep 2020 11:58)  HR: 74 (04 Sep 2020 05:19) (68 - 96)  BP: 113/64 (04 Sep 2020 05:19) (113/64 - 172/74)  BP(mean): 93 (03 Sep 2020 11:58) (93 - 106)  RR: 16 (04 Sep 2020 05:19) (15 - 17)  SpO2: 95% (04 Sep 2020 05:19) (86% - 99%)    PHYSICAL EXAM:  General: A&Ox3 NAD  LLE: Dressing c/d/i. Motor intact + EHL/FHL/TA/GS.  Sensation is grossly intact.  Extremity warm, compartments soft, compressible. No calf tenderness. DP 2+   RLE: Motor intact +EHL/FHL/TA/GS. Sensation is grossly intact. Extremity warm, compartments soft, compressible. No calf tenderness. DP2+    Labs:                          10.6   9.26  )-----------( 253      ( 03 Sep 2020 11:21 )             31.9 ORTHO PROGRESS NOTE    Pt seen and examined at bedside this am. Doing well, pain controlled. No acute events overnight.    Vital Signs Last 24 Hrs  T(C): 36.3 (04 Sep 2020 05:19), Max: 36.7 (03 Sep 2020 11:58)  T(F): 97.4 (04 Sep 2020 05:19), Max: 98 (03 Sep 2020 11:58)  HR: 74 (04 Sep 2020 05:19) (68 - 96)  BP: 113/64 (04 Sep 2020 05:19) (113/64 - 172/74)  BP(mean): 93 (03 Sep 2020 11:58) (93 - 106)  RR: 16 (04 Sep 2020 05:19) (15 - 17)  SpO2: 95% (04 Sep 2020 05:19) (86% - 99%)    PHYSICAL EXAM:  General: A&Ox3 NAD  LLE: Proximal 2 dressings stained with serosanguinous drainage --> changed, now c/d/i. Motor intact + EHL/FHL/TA/GS.  Sensation is grossly intact.  Extremity warm, compartments soft, compressible. No calf tenderness. DP 2+   RLE: Motor intact +EHL/FHL/TA/GS. Sensation is grossly intact. Extremity warm, compartments soft, compressible. No calf tenderness. DP2+    Labs:                          10.6   9.26  )-----------( 253      ( 03 Sep 2020 11:21 )             31.9 ORTHO PROGRESS NOTE    Pt seen and examined at bedside this am. Doing well, pain controlled. No acute events overnight.    Vital Signs Last 24 Hrs  T(C): 36.3 (04 Sep 2020 05:19), Max: 36.7 (03 Sep 2020 11:58)  T(F): 97.4 (04 Sep 2020 05:19), Max: 98 (03 Sep 2020 11:58)  HR: 74 (04 Sep 2020 05:19) (68 - 96)  BP: 113/64 (04 Sep 2020 05:19) (113/64 - 172/74)  BP(mean): 93 (03 Sep 2020 11:58) (93 - 106)  RR: 16 (04 Sep 2020 05:19) (15 - 17)  SpO2: 95% (04 Sep 2020 05:19) (86% - 99%)    PHYSICAL EXAM:  General: A&Ox3 NAD  LLE: Proximal 2 dressings stained with serosanguinous drainage --> changed, now c/d/i. Motor intact + EHL/FHL/TA/GS.  Sensation is grossly intact.  Extremity warm, compartments soft, compressible. No calf tenderness. DP 2+     Labs:                          10.6   9.26  )-----------( 253      ( 03 Sep 2020 11:21 )             31.9       89F s/p L hip EDVIN and core decompression, POD #1    Plan:  -WBAT  -pain control  -incentive spirometry  -DVT ppx  -PT/OT  -f/u am labs  -dispo - home today

## 2020-09-09 DIAGNOSIS — R33.9 RETENTION OF URINE, UNSPECIFIED: ICD-10-CM
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
## 2020-09-09 DIAGNOSIS — Z88.8 ALLERGY STATUS TO OTHER DRUGS, MEDICAMENTS AND BIOLOGICAL SUBSTANCES STATUS: ICD-10-CM

## 2020-09-09 DIAGNOSIS — I83.93 ASYMPTOMATIC VARICOSE VEINS OF BILATERAL LOWER EXTREMITIES: ICD-10-CM

## 2020-09-09 DIAGNOSIS — E87.5 HYPERKALEMIA: ICD-10-CM
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
## 2020-09-09 DIAGNOSIS — Z91.011 ALLERGY TO MILK PRODUCTS: ICD-10-CM

## 2020-09-09 DIAGNOSIS — T40.2X5A ADVERSE EFFECT OF OTHER OPIOIDS, INITIAL ENCOUNTER: ICD-10-CM

## 2020-09-09 DIAGNOSIS — K59.03 DRUG INDUCED CONSTIPATION: ICD-10-CM

## 2020-09-09 DIAGNOSIS — T84.84XA PAIN DUE TO INTERNAL ORTHOPEDIC PROSTHETIC DEVICES, IMPLANTS AND GRAFTS, INITIAL ENCOUNTER: ICD-10-CM

## 2020-09-09 DIAGNOSIS — I10 ESSENTIAL (PRIMARY) HYPERTENSION: ICD-10-CM

## 2020-09-09 DIAGNOSIS — F41.9 ANXIETY DISORDER, UNSPECIFIED: ICD-10-CM

## 2020-09-09 DIAGNOSIS — S76.212A STRAIN OF ADDUCTOR MUSCLE, FASCIA AND TENDON OF LEFT THIGH, INITIAL ENCOUNTER: ICD-10-CM

## 2020-09-09 DIAGNOSIS — I49.1 ATRIAL PREMATURE DEPOLARIZATION: ICD-10-CM

## 2020-09-09 DIAGNOSIS — Z88.4 ALLERGY STATUS TO ANESTHETIC AGENT: ICD-10-CM
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
## 2020-09-09 DIAGNOSIS — E87.1 HYPO-OSMOLALITY AND HYPONATREMIA: ICD-10-CM

## 2020-09-09 DIAGNOSIS — Z88.1 ALLERGY STATUS TO OTHER ANTIBIOTIC AGENTS STATUS: ICD-10-CM

## 2020-09-09 DIAGNOSIS — Z88.6 ALLERGY STATUS TO ANALGESIC AGENT: ICD-10-CM

## 2020-09-09 DIAGNOSIS — Z88.0 ALLERGY STATUS TO PENICILLIN: ICD-10-CM
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
## 2020-09-09 DIAGNOSIS — M87.852 OTHER OSTEONECROSIS, LEFT FEMUR: ICD-10-CM

## 2020-09-09 DIAGNOSIS — D62 ACUTE POSTHEMORRHAGIC ANEMIA: ICD-10-CM

## 2020-09-09 DIAGNOSIS — M81.0 AGE-RELATED OSTEOPOROSIS WITHOUT CURRENT PATHOLOGICAL FRACTURE: ICD-10-CM

## 2020-09-10 ENCOUNTER — EMERGENCY (EMERGENCY)
Facility: HOSPITAL | Age: 85
LOS: 1 days | Discharge: HOME | End: 2020-09-10
Attending: EMERGENCY MEDICINE | Admitting: EMERGENCY MEDICINE
Payer: MEDICARE

## 2020-09-10 VITALS
SYSTOLIC BLOOD PRESSURE: 166 MMHG | HEART RATE: 98 BPM | TEMPERATURE: 99 F | DIASTOLIC BLOOD PRESSURE: 68 MMHG | RESPIRATION RATE: 17 BRPM | OXYGEN SATURATION: 94 %

## 2020-09-10 VITALS
TEMPERATURE: 99 F | OXYGEN SATURATION: 94 % | HEIGHT: 61 IN | WEIGHT: 111.99 LBS | HEART RATE: 97 BPM | SYSTOLIC BLOOD PRESSURE: 182 MMHG | DIASTOLIC BLOOD PRESSURE: 76 MMHG | RESPIRATION RATE: 17 BRPM

## 2020-09-10 DIAGNOSIS — Z88.8 ALLERGY STATUS TO OTHER DRUGS, MEDICAMENTS AND BIOLOGICAL SUBSTANCES STATUS: ICD-10-CM

## 2020-09-10 DIAGNOSIS — Z79.82 LONG TERM (CURRENT) USE OF ASPIRIN: ICD-10-CM

## 2020-09-10 DIAGNOSIS — Z88.5 ALLERGY STATUS TO NARCOTIC AGENT: ICD-10-CM

## 2020-09-10 DIAGNOSIS — Z88.1 ALLERGY STATUS TO OTHER ANTIBIOTIC AGENTS STATUS: ICD-10-CM

## 2020-09-10 DIAGNOSIS — M96.842 POSTPROCEDURAL SEROMA OF A MUSCULOSKELETAL STRUCTURE FOLLOWING A MUSCULOSKELETAL SYSTEM PROCEDURE: ICD-10-CM

## 2020-09-10 DIAGNOSIS — Z88.6 ALLERGY STATUS TO ANALGESIC AGENT: ICD-10-CM

## 2020-09-10 DIAGNOSIS — Z79.899 OTHER LONG TERM (CURRENT) DRUG THERAPY: ICD-10-CM

## 2020-09-10 DIAGNOSIS — Z79.1 LONG TERM (CURRENT) USE OF NON-STEROIDAL ANTI-INFLAMMATORIES (NSAID): ICD-10-CM

## 2020-09-10 DIAGNOSIS — I10 ESSENTIAL (PRIMARY) HYPERTENSION: ICD-10-CM

## 2020-09-10 DIAGNOSIS — Z98.890 OTHER SPECIFIED POSTPROCEDURAL STATES: Chronic | ICD-10-CM

## 2020-09-10 DIAGNOSIS — Z90.49 ACQUIRED ABSENCE OF OTHER SPECIFIED PARTS OF DIGESTIVE TRACT: Chronic | ICD-10-CM

## 2020-09-10 LAB
ANION GAP SERPL CALC-SCNC: 7 MMOL/L — SIGNIFICANT CHANGE UP (ref 5–17)
BASOPHILS # BLD AUTO: 0.02 K/UL — SIGNIFICANT CHANGE UP (ref 0–0.2)
BASOPHILS NFR BLD AUTO: 0.2 % — SIGNIFICANT CHANGE UP (ref 0–2)
BUN SERPL-MCNC: 10 MG/DL — SIGNIFICANT CHANGE UP (ref 7–23)
CALCIUM SERPL-MCNC: 8.8 MG/DL — SIGNIFICANT CHANGE UP (ref 8.4–10.5)
CHLORIDE SERPL-SCNC: 96 MMOL/L — SIGNIFICANT CHANGE UP (ref 96–108)
CO2 SERPL-SCNC: 32 MMOL/L — HIGH (ref 22–31)
CREAT SERPL-MCNC: 0.62 MG/DL — SIGNIFICANT CHANGE UP (ref 0.5–1.3)
CRP SERPL-MCNC: 1.74 MG/DL — HIGH (ref 0–0.4)
EOSINOPHIL # BLD AUTO: 0.23 K/UL — SIGNIFICANT CHANGE UP (ref 0–0.5)
EOSINOPHIL NFR BLD AUTO: 2.2 % — SIGNIFICANT CHANGE UP (ref 0–6)
ERYTHROCYTE [SEDIMENTATION RATE] IN BLOOD: 29 MM/HR — HIGH
GLUCOSE SERPL-MCNC: 115 MG/DL — HIGH (ref 70–99)
HCT VFR BLD CALC: 25.9 % — LOW (ref 34.5–45)
HGB BLD-MCNC: 8.3 G/DL — LOW (ref 11.5–15.5)
IMM GRANULOCYTES NFR BLD AUTO: 0.9 % — SIGNIFICANT CHANGE UP (ref 0–1.5)
LYMPHOCYTES # BLD AUTO: 1.46 K/UL — SIGNIFICANT CHANGE UP (ref 1–3.3)
LYMPHOCYTES # BLD AUTO: 14.1 % — SIGNIFICANT CHANGE UP (ref 13–44)
MCHC RBC-ENTMCNC: 29.2 PG — SIGNIFICANT CHANGE UP (ref 27–34)
MCHC RBC-ENTMCNC: 32 GM/DL — SIGNIFICANT CHANGE UP (ref 32–36)
MCV RBC AUTO: 91.2 FL — SIGNIFICANT CHANGE UP (ref 80–100)
MONOCYTES # BLD AUTO: 0.98 K/UL — HIGH (ref 0–0.9)
MONOCYTES NFR BLD AUTO: 9.5 % — SIGNIFICANT CHANGE UP (ref 2–14)
NEUTROPHILS # BLD AUTO: 7.54 K/UL — HIGH (ref 1.8–7.4)
NEUTROPHILS NFR BLD AUTO: 73.1 % — SIGNIFICANT CHANGE UP (ref 43–77)
NRBC # BLD: 0 /100 WBCS — SIGNIFICANT CHANGE UP (ref 0–0)
PLATELET # BLD AUTO: 337 K/UL — SIGNIFICANT CHANGE UP (ref 150–400)
POTASSIUM SERPL-MCNC: 4.4 MMOL/L — SIGNIFICANT CHANGE UP (ref 3.5–5.3)
POTASSIUM SERPL-SCNC: 4.4 MMOL/L — SIGNIFICANT CHANGE UP (ref 3.5–5.3)
RBC # BLD: 2.84 M/UL — LOW (ref 3.8–5.2)
RBC # FLD: 14.7 % — HIGH (ref 10.3–14.5)
SODIUM SERPL-SCNC: 135 MMOL/L — SIGNIFICANT CHANGE UP (ref 135–145)
WBC # BLD: 10.32 K/UL — SIGNIFICANT CHANGE UP (ref 3.8–10.5)
WBC # FLD AUTO: 10.32 K/UL — SIGNIFICANT CHANGE UP (ref 3.8–10.5)

## 2020-09-10 PROCEDURE — 86140 C-REACTIVE PROTEIN: CPT

## 2020-09-10 PROCEDURE — 85025 COMPLETE CBC W/AUTO DIFF WBC: CPT

## 2020-09-10 PROCEDURE — 99284 EMERGENCY DEPT VISIT MOD MDM: CPT

## 2020-09-10 PROCEDURE — 99284 EMERGENCY DEPT VISIT MOD MDM: CPT | Mod: 25

## 2020-09-10 PROCEDURE — 36415 COLL VENOUS BLD VENIPUNCTURE: CPT

## 2020-09-10 PROCEDURE — 80048 BASIC METABOLIC PNL TOTAL CA: CPT

## 2020-09-10 PROCEDURE — 85652 RBC SED RATE AUTOMATED: CPT

## 2020-09-10 NOTE — ED ADULT NURSE REASSESSMENT NOTE - NS ED NURSE REASSESS COMMENT FT1
Pt received from Jonna YOON. Pt laying in stretcher, denies complaints at this time. Safety measures in place. All needs attended.

## 2020-09-10 NOTE — CONSULT NOTE ADULT - SUBJECTIVE AND OBJECTIVE BOX
Orthopaedic Surgery Consult Note    For Surgeon: Marcos    HPI: Patient is a 89y old F s/p L hip EDVIN, core decompression, abductor muscle repair on 9/3. Pt was discharged home, then later admitted to OSH for SBO. Pt was discharged home today and showered, leading to L hip dressings getting wet. Pt and daughter were concerned, called Dr. Rodríguez, who advised pt to present to ED for evaluation by ortho team. Pt states she has not been walking much on the leg since she has been in hospital for other issues. Denies fever, chills, increasing pain in hip. States hip feels fine, still some pain with ROM but no significant difference since surgery.        Allergies    carvedilol (Unknown)  codeine (Unknown)  conjugated estrogens (Unknown)  doxycycline (Unknown)  Levaquin (Unknown)  lubiprostone (Unknown)  nitrofurantoin (Unknown)  nortriptyline (Unknown)  penicillin (Unknown)  Tylenol (Rash)    Intolerances      PAST MEDICAL & SURGICAL HISTORY:  Anxiety  Osteoporosis  HTN (hypertension)  H/O eye surgery  History of hip surgery  History of appendectomy    MEDICATIONS  (STANDING):    MEDICATIONS  (PRN):      Vital Signs Last 24 Hrs  T(C): 37.1 (10 Sep 2020 18:26), Max: 37.4 (10 Sep 2020 17:39)  T(F): 98.7 (10 Sep 2020 18:26), Max: 99.3 (10 Sep 2020 17:39)  HR: 96 (10 Sep 2020 18:26) (96 - 97)  BP: 163/73 (10 Sep 2020 18:26) (163/73 - 182/76)  BP(mean): --  RR: 17 (10 Sep 2020 18:26) (17 - 17)  SpO2: 95% (10 Sep 2020 18:26) (94% - 95%)      Physical Exam:  General: Resting comfortably in bed. AAOx3. NAD.  Extremities:        LLE: Large ecchymoses over L hip around surgical area without significant induration. No tenderness to palpation around hip. Dressings removed, 3 incisions over lateral hip appear well-healing w/ staples intact. No surrounding erythema, no drainage, and no fluid or drainage with palpation around incisions. SILT L2-S1 distribution, symmetric. AROM hip and knee intact with mild discomfort. TA/EHL/FHL/GS motor intact. WWP       RLE: No gross deformity. SILT L2-S1 distribution, symmetric. TA/EHL/FHL/GS motor intact. WWP, DP                          8.3    10.32 )-----------( 337      ( 10 Sep 2020 19:19 )             25.9     09-10    135  |  96  |  10  ----------------------------<  115<H>  4.4   |  32<H>  |  0.62    Ca    8.8      10 Sep 2020 19:19          A/P: 89yFemale s/p L hip EDVIN, core decompression 9/3 presenting for concern over dressings getting wet after shower. Afebrile, VSS, WBC wnl. No need for further labs or imaging per Dr. Rodríguez. Patient safe for discharge home.   - Pain control  - Advised to return to ED for fever, chills, new drainage from wound or worsening pain in L hip.   - Cont 50% weight bearing with assistive device  - F/u with Dr. Rodríguez in office next week Tues or Wed  - Discussed with Dr. Rodríguez    Ortho Pager [phone]

## 2020-09-10 NOTE — ED PROVIDER NOTE - PATIENT PORTAL LINK FT
You can access the FollowMyHealth Patient Portal offered by [facility] by registering at the following website: [email or web address]. By joining [employer]’s FollowMyHealth portal, you will also be able to view your health information using other applications (apps) compatible with our system.

## 2020-09-10 NOTE — ED ADULT TRIAGE NOTE - CHIEF COMPLAINT QUOTE
pt referred to ED by PCP for evaluation. as per daughter pt had left hip surgery last Thursday, pt c/o left hip pain and oozing from the surgical incision.

## 2020-09-10 NOTE — ED ADULT NURSE NOTE - OBJECTIVE STATEMENT
89 y/o female came in to ED for evaluation of surgical site s/o left hip surgery. Pt reports that she had some "pinkish drainage" s/p taking a shower. Dressing lifted, wound appears clean, no redness or drainage noted, [place] present.

## 2020-09-10 NOTE — ED PROVIDER NOTE - PHYSICAL EXAMINATION
Limited PE performed in the setting of the COVID10 pandemic, in efforts to limit exposure and cross-contamination  Constitutional: Well appearing, well nourished, awake, alert, oriented to person, place, time/situation and in no apparent distress.  ENMT: Airway patent.   Eyes: Clear bilaterally  Cardiac: Normal rate, regular rhythm.   Respiratory: No increased WOB, tachypnea, hypoxia, or accessory mm use. Pt speaks in full sentences.   Gastrointestinal: Abd soft, NT, ND. No guarding, rebound, or rigidity.   Musculoskeletal: Range of motion is not limited. L hip wound w/ staples in place. C/D/I. No discharge. No erythema / induration / fluctuance. No warmth. + hematoma / ecchymosis superior aspect  Neuro: Alert and oriented x 3, face symmetric and speech fluent. Nml gross motor movement, grossly non focal   Skin: Skin normal color for race, warm, dry and intact. See above in [facility]  Psych: Alert and oriented to person, place, time/situation. normal mood and affect. no apparent risk to self or others.

## 2020-09-10 NOTE — ED PROVIDER NOTE - NSFOLLOWUPINSTRUCTIONS_ED_ALL_ED_FT
You were evaluated in the ED by the orthopedics resident. Your dressing was changed. The wound appear clean and without signs of infection. Your blood work is stable, including your Hemoglobin (8.3 today, compared to 8.4 on 9/4 before leaving the hospital). Continue partial weight bearing. See Dr Rodríguez on Tuesday in the office.     Hematoma    WHAT YOU NEED TO KNOW:    A hematoma is a collection of blood. A bruise is a type of hematoma. A hematoma may form in a muscle or in the tissues just under the skin. A hematoma that forms under the skin will feel like a bump or hard mass. Hematomas can happen anywhere in your body, including in your brain. Your body may break down and absorb a mild hematoma on its own. A more serious hematoma may need treatment.     DISCHARGE INSTRUCTIONS:    Medicines: You may need any of the following:     Prescription pain medicine may be given. Ask how to take this medicine safely.      NSAIDs, such as ibuprofen, help decrease swelling, pain, and fever. This medicine is available with or without a doctor's order. NSAIDs can cause stomach bleeding or kidney problems in certain people. If you take blood thinner medicine, always ask your healthcare provider if NSAIDs are safe for you. Always read the medicine label and follow directions.      Antibiotics prevent or treat a bacterial infection.      Take your medicine as directed. Contact your healthcare provider if you think your medicine is not helping or if you have side effects. Tell him of her if you are allergic to any medicine. Keep a list of the medicines, vitamins, and herbs you take. Include the amounts, and when and why you take them. Bring the list or the pill bottles to follow-up visits. Carry your medicine list with you in case of an emergency.    Return to the emergency department if:     You have new or worsening pain, or pain that does not get better with medicine.      You have a fever.      You have trouble moving the body part that has the hematoma.    Contact your healthcare provider if:     You have questions or concerns about your condition or care.        Follow up with your healthcare provider as directed: You may need to have surgery if your hematoma is severe. You may also need other tests to make sure there is no other damage that needs to be treated. Write down your questions so you remember to ask them during your visits.    Self-care:     Rest the area. Rest will help your body heal and will also help prevent more damage.      Apply ice as directed. Ice helps reduce swelling. Ice may also help prevent tissue damage. Use an ice pack, or put crushed ice in a bag. Cover it with a towel. Place it on your hematoma for 20 minutes every hour, or as directed. Ask how many times each day to apply ice, and for how many days.      Compress the injury if possible. Lightly wrap the injury with an elastic or soft bandage. This may help control swelling. Ask your healthcare provider how to wrap your injury properly.       Elevate the area as directed. If possible, raise the area above the level of your heart as often as you can. This will help decrease swelling.       Keep the hematoma covered with a bandage. This will help protect the area while it heals.

## 2020-09-10 NOTE — ED PROVIDER NOTE - CLINICAL SUMMARY MEDICAL DECISION MAKING FREE TEXT BOX
Pt presents for wound check s/p recent hip surgery. No signs of infection. Wound appears C/D/I. Afebrile. No sig lab dyscrasias to indicate infection. Postop hematoma appears wnl. Will have ortho eval for further recommendations. Dispo pending ortho recommendations

## 2020-09-10 NOTE — ED PROVIDER NOTE - PROGRESS NOTE DETAILS
Ortho consulted and will see the pt Pt seen and examined by ortho, d/w Dr Rodríguez. Dressing changed. + hematoma. Wound C/D/I. Pt may be d/c'd home (in c/o daughter whom is taking her home). Continue partial weight bearing. F/u Dr Rodríguez on Tuesday in the office

## 2020-09-11 ENCOUNTER — APPOINTMENT (OUTPATIENT)
Dept: ORTHOPEDIC SURGERY | Facility: CLINIC | Age: 85
End: 2020-09-11

## 2020-09-14 PROBLEM — I10 ESSENTIAL (PRIMARY) HYPERTENSION: Chronic | Status: ACTIVE | Noted: 2020-09-02

## 2020-09-14 PROBLEM — M81.0 AGE-RELATED OSTEOPOROSIS WITHOUT CURRENT PATHOLOGICAL FRACTURE: Chronic | Status: ACTIVE | Noted: 2020-09-02

## 2020-09-14 PROBLEM — F41.9 ANXIETY DISORDER, UNSPECIFIED: Chronic | Status: ACTIVE | Noted: 2020-09-02

## 2020-09-15 ENCOUNTER — APPOINTMENT (OUTPATIENT)
Dept: ORTHOPEDIC SURGERY | Facility: CLINIC | Age: 85
End: 2020-09-15
Payer: MEDICARE

## 2020-09-15 PROCEDURE — 99024 POSTOP FOLLOW-UP VISIT: CPT

## 2020-09-15 RX ORDER — MELOXICAM 7.5 MG/1
7.5 TABLET ORAL DAILY
Qty: 60 | Refills: 0 | Status: ACTIVE | COMMUNITY
Start: 2020-09-15 | End: 1900-01-01

## 2020-09-21 NOTE — HISTORY OF PRESENT ILLNESS
[de-identified] : 89 y/o F, s/p left hip THR on 09/03/20 here for 1st post operative visit. Pt left hip is overall doing great and has no complaints about the hip. Ambulation/mobility/walking is going great.

## 2020-09-21 NOTE — ASSESSMENT
[FreeTextEntry1] : Assessment\par S/p left THR on 09/03/20, doing great\par \par Plan\par Plan\par Status post left total hip, incision is healing well. She will continue weightbearing and hip precautions until 5 weeks, weightbearing as tolerated after that and restrictions lifted, begin physical therapy at 5 weeks - prescription given, follow up in 10 week anniversary with x-rays, all questions answered.\par \par

## 2020-09-21 NOTE — PHYSICAL EXAM
[de-identified] : General: Not in acute distress, dressed appropriately, sitting on examination table\par Skin: Warm and dry, normal turgor, no rashes\par Neurological: AOx3, Cranial nerves grossly in tact\par Psych: Mood and affect appropriate\par \par Left Hip: Well healed surgical incision. No swelling edema erythema redness or drainage. Nontender. ROM: Not assessed. 5/5 strength. Normal gait. Ambulates with a wheeled walker. \par \par  [de-identified] : No imaging today.\par

## 2020-09-21 NOTE — END OF VISIT
[FreeTextEntry3] : By signing my name below, I, Xochitl Valero, attest that this documentation has been prepared under the direction and in the presence of Wai Bennett PA-C.\par

## 2020-10-13 ENCOUNTER — APPOINTMENT (OUTPATIENT)
Dept: ORTHOPEDIC SURGERY | Facility: CLINIC | Age: 85
End: 2020-10-13
Payer: MEDICARE

## 2020-10-13 PROCEDURE — 99024 POSTOP FOLLOW-UP VISIT: CPT

## 2020-10-14 NOTE — HISTORY OF PRESENT ILLNESS
[de-identified] : 89 y/o F, s/p removal hardware, core decompression and abductor muscle repair of left hip on 09/03/20 here for 2nd post operative visit. Continues to take Aspirin regularly and uses a walker for ambulation. Rubs Vitamin E ointment over incision regularly. Overall, pt left hip is doing great. \par

## 2020-10-14 NOTE — ASSESSMENT
[FreeTextEntry1] : Assessment\par S/p removal hardware, core decompression and abductor muscle repair of left hip on 09/03/20, doing great\par \par Plan\par Will start attending physical therapy\par Will repeat x-ray of the left hip \par F/U in 6 weeks\par \par

## 2020-10-14 NOTE — END OF VISIT
[FreeTextEntry3] : By signing my name below, I, Xochitl Valero, attest that this documentation has been prepared under the direction and in the presence of Ann Polo PA-C.

## 2020-10-14 NOTE — REVIEW OF SYSTEMS
[Joint Pain] : joint pain [Negative] : Heme/Lymph [Arthralgia] : no arthralgia [Joint Swelling] : no joint swelling [Joint Stiffness] : no joint stiffness

## 2020-10-14 NOTE — PHYSICAL EXAM
[de-identified] : General: Not in acute distress, dressed appropriately, sitting on examination table\par Skin: Warm and dry, normal turgor, no rashes\par Neurological: AOx3, Cranial nerves grossly in tact\par Psych: Mood and affect appropriate\par \par Left Hip: Well healed surgical incision. No swelling edema erythema redness or drainage. Nontender. ROM: Not assessed. 5/5 strength. Normal gait. Ambulates with a wheeled walker. \par \par

## 2020-11-04 ENCOUNTER — TRANSCRIPTION ENCOUNTER (OUTPATIENT)
Age: 85
End: 2020-11-04

## 2020-11-04 ENCOUNTER — INPATIENT (INPATIENT)
Facility: HOSPITAL | Age: 85
LOS: 5 days | Discharge: NURSING, REHAB OR LONG-TERM CARE | DRG: 522 | End: 2020-11-10
Attending: ORTHOPAEDIC SURGERY | Admitting: ORTHOPAEDIC SURGERY
Payer: MEDICARE

## 2020-11-04 VITALS
SYSTOLIC BLOOD PRESSURE: 126 MMHG | DIASTOLIC BLOOD PRESSURE: 88 MMHG | HEIGHT: 61 IN | HEART RATE: 88 BPM | WEIGHT: 115.08 LBS | OXYGEN SATURATION: 98 % | RESPIRATION RATE: 18 BRPM | TEMPERATURE: 98 F
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
## 2020-11-04 DIAGNOSIS — Z88.1 ALLERGY STATUS TO OTHER ANTIBIOTIC AGENTS STATUS: ICD-10-CM

## 2020-11-04 DIAGNOSIS — I83.93 ASYMPTOMATIC VARICOSE VEINS OF BILATERAL LOWER EXTREMITIES: ICD-10-CM

## 2020-11-04 DIAGNOSIS — I10 ESSENTIAL (PRIMARY) HYPERTENSION: ICD-10-CM

## 2020-11-04 DIAGNOSIS — T39.1X5A ADVERSE EFFECT OF 4-AMINOPHENOL DERIVATIVES, INITIAL ENCOUNTER: ICD-10-CM

## 2020-11-04 DIAGNOSIS — K59.04 CHRONIC IDIOPATHIC CONSTIPATION: ICD-10-CM

## 2020-11-04 DIAGNOSIS — Z88.6 ALLERGY STATUS TO ANALGESIC AGENT: ICD-10-CM
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
## 2020-11-04 DIAGNOSIS — Z90.49 ACQUIRED ABSENCE OF OTHER SPECIFIED PARTS OF DIGESTIVE TRACT: ICD-10-CM

## 2020-11-04 DIAGNOSIS — E87.1 HYPO-OSMOLALITY AND HYPONATREMIA: ICD-10-CM

## 2020-11-04 DIAGNOSIS — Z98.890 OTHER SPECIFIED POSTPROCEDURAL STATES: Chronic | ICD-10-CM

## 2020-11-04 DIAGNOSIS — D72.829 ELEVATED WHITE BLOOD CELL COUNT, UNSPECIFIED: ICD-10-CM

## 2020-11-04 DIAGNOSIS — Z90.49 ACQUIRED ABSENCE OF OTHER SPECIFIED PARTS OF DIGESTIVE TRACT: Chronic | ICD-10-CM

## 2020-11-04 DIAGNOSIS — E87.6 HYPOKALEMIA: ICD-10-CM

## 2020-11-04 DIAGNOSIS — S72.002A FRACTURE OF UNSPECIFIED PART OF NECK OF LEFT FEMUR, INITIAL ENCOUNTER FOR CLOSED FRACTURE: ICD-10-CM

## 2020-11-04 DIAGNOSIS — Z79.82 LONG TERM (CURRENT) USE OF ASPIRIN: ICD-10-CM

## 2020-11-04 DIAGNOSIS — M81.0 AGE-RELATED OSTEOPOROSIS WITHOUT CURRENT PATHOLOGICAL FRACTURE: ICD-10-CM

## 2020-11-04 DIAGNOSIS — K71.9 TOXIC LIVER DISEASE, UNSPECIFIED: ICD-10-CM

## 2020-11-04 DIAGNOSIS — E44.0 MODERATE PROTEIN-CALORIE MALNUTRITION: ICD-10-CM

## 2020-11-04 DIAGNOSIS — D62 ACUTE POSTHEMORRHAGIC ANEMIA: ICD-10-CM

## 2020-11-04 DIAGNOSIS — F41.9 ANXIETY DISORDER, UNSPECIFIED: ICD-10-CM

## 2020-11-04 DIAGNOSIS — Z88.0 ALLERGY STATUS TO PENICILLIN: ICD-10-CM

## 2020-11-04 LAB
ALBUMIN SERPL ELPH-MCNC: 3.6 G/DL — SIGNIFICANT CHANGE UP (ref 3.3–5)
ALP SERPL-CCNC: 277 U/L — HIGH (ref 40–120)
ALT FLD-CCNC: 473 U/L — HIGH (ref 10–45)
ANION GAP SERPL CALC-SCNC: 10 MMOL/L — SIGNIFICANT CHANGE UP (ref 5–17)
ANION GAP SERPL CALC-SCNC: 11 MMOL/L — SIGNIFICANT CHANGE UP (ref 5–17)
APPEARANCE UR: CLEAR — SIGNIFICANT CHANGE UP
AST SERPL-CCNC: 464 U/L — HIGH (ref 10–40)
BACTERIA # UR AUTO: PRESENT /HPF
BASOPHILS # BLD AUTO: 0.03 K/UL — SIGNIFICANT CHANGE UP (ref 0–0.2)
BASOPHILS NFR BLD AUTO: 0.3 % — SIGNIFICANT CHANGE UP (ref 0–2)
BILIRUB DIRECT SERPL-MCNC: <0.2 MG/DL — SIGNIFICANT CHANGE UP (ref 0–0.2)
BILIRUB INDIRECT FLD-MCNC: SIGNIFICANT CHANGE UP MG/DL (ref 0.2–1)
BILIRUB SERPL-MCNC: 0.2 MG/DL — SIGNIFICANT CHANGE UP (ref 0.2–1.2)
BILIRUB UR-MCNC: NEGATIVE — SIGNIFICANT CHANGE UP
BUN SERPL-MCNC: 12 MG/DL — SIGNIFICANT CHANGE UP (ref 7–23)
BUN SERPL-MCNC: 14 MG/DL — SIGNIFICANT CHANGE UP (ref 7–23)
CALCIUM SERPL-MCNC: 9 MG/DL — SIGNIFICANT CHANGE UP (ref 8.4–10.5)
CALCIUM SERPL-MCNC: 9.2 MG/DL — SIGNIFICANT CHANGE UP (ref 8.4–10.5)
CHLORIDE SERPL-SCNC: 96 MMOL/L — SIGNIFICANT CHANGE UP (ref 96–108)
CHLORIDE SERPL-SCNC: 96 MMOL/L — SIGNIFICANT CHANGE UP (ref 96–108)
CO2 SERPL-SCNC: 26 MMOL/L — SIGNIFICANT CHANGE UP (ref 22–31)
CO2 SERPL-SCNC: 30 MMOL/L — SIGNIFICANT CHANGE UP (ref 22–31)
COLOR SPEC: YELLOW — SIGNIFICANT CHANGE UP
CREAT SERPL-MCNC: 0.56 MG/DL — SIGNIFICANT CHANGE UP (ref 0.5–1.3)
CREAT SERPL-MCNC: 0.56 MG/DL — SIGNIFICANT CHANGE UP (ref 0.5–1.3)
DIFF PNL FLD: NEGATIVE — SIGNIFICANT CHANGE UP
EOSINOPHIL # BLD AUTO: 0.03 K/UL — SIGNIFICANT CHANGE UP (ref 0–0.5)
EOSINOPHIL NFR BLD AUTO: 0.3 % — SIGNIFICANT CHANGE UP (ref 0–6)
GGT SERPL-CCNC: 170 U/L — HIGH (ref 8–40)
GLUCOSE SERPL-MCNC: 104 MG/DL — HIGH (ref 70–99)
GLUCOSE SERPL-MCNC: 111 MG/DL — HIGH (ref 70–99)
GLUCOSE UR QL: NEGATIVE — SIGNIFICANT CHANGE UP
HAV IGM SER-ACNC: SIGNIFICANT CHANGE UP
HBV CORE IGM SER-ACNC: SIGNIFICANT CHANGE UP
HBV SURFACE AG SER-ACNC: SIGNIFICANT CHANGE UP
HCT VFR BLD CALC: 36.7 % — SIGNIFICANT CHANGE UP (ref 34.5–45)
HCV AB S/CO SERPL IA: 0.05 S/CO — SIGNIFICANT CHANGE UP
HCV AB SERPL-IMP: SIGNIFICANT CHANGE UP
HGB BLD-MCNC: 12 G/DL — SIGNIFICANT CHANGE UP (ref 11.5–15.5)
IMM GRANULOCYTES NFR BLD AUTO: 0.5 % — SIGNIFICANT CHANGE UP (ref 0–1.5)
KETONES UR-MCNC: NEGATIVE — SIGNIFICANT CHANGE UP
LEUKOCYTE ESTERASE UR-ACNC: ABNORMAL
LYMPHOCYTES # BLD AUTO: 1.22 K/UL — SIGNIFICANT CHANGE UP (ref 1–3.3)
LYMPHOCYTES # BLD AUTO: 11.2 % — LOW (ref 13–44)
MCHC RBC-ENTMCNC: 28.6 PG — SIGNIFICANT CHANGE UP (ref 27–34)
MCHC RBC-ENTMCNC: 32.7 GM/DL — SIGNIFICANT CHANGE UP (ref 32–36)
MCV RBC AUTO: 87.4 FL — SIGNIFICANT CHANGE UP (ref 80–100)
MONOCYTES # BLD AUTO: 0.81 K/UL — SIGNIFICANT CHANGE UP (ref 0–0.9)
MONOCYTES NFR BLD AUTO: 7.4 % — SIGNIFICANT CHANGE UP (ref 2–14)
NEUTROPHILS # BLD AUTO: 8.75 K/UL — HIGH (ref 1.8–7.4)
NEUTROPHILS NFR BLD AUTO: 80.3 % — HIGH (ref 43–77)
NITRITE UR-MCNC: NEGATIVE — SIGNIFICANT CHANGE UP
NRBC # BLD: 0 /100 WBCS — SIGNIFICANT CHANGE UP (ref 0–0)
PH UR: 6.5 — SIGNIFICANT CHANGE UP (ref 5–8)
PLATELET # BLD AUTO: 355 K/UL — SIGNIFICANT CHANGE UP (ref 150–400)
POTASSIUM SERPL-MCNC: 3.7 MMOL/L — SIGNIFICANT CHANGE UP (ref 3.5–5.3)
POTASSIUM SERPL-MCNC: 4.2 MMOL/L — SIGNIFICANT CHANGE UP (ref 3.5–5.3)
POTASSIUM SERPL-SCNC: 3.7 MMOL/L — SIGNIFICANT CHANGE UP (ref 3.5–5.3)
POTASSIUM SERPL-SCNC: 4.2 MMOL/L — SIGNIFICANT CHANGE UP (ref 3.5–5.3)
PROT SERPL-MCNC: 6.1 G/DL — SIGNIFICANT CHANGE UP (ref 6–8.3)
PROT UR-MCNC: NEGATIVE MG/DL — SIGNIFICANT CHANGE UP
RBC # BLD: 4.2 M/UL — SIGNIFICANT CHANGE UP (ref 3.8–5.2)
RBC # FLD: 13 % — SIGNIFICANT CHANGE UP (ref 10.3–14.5)
RBC CASTS # UR COMP ASSIST: < 5 /HPF — SIGNIFICANT CHANGE UP
SODIUM SERPL-SCNC: 133 MMOL/L — LOW (ref 135–145)
SODIUM SERPL-SCNC: 136 MMOL/L — SIGNIFICANT CHANGE UP (ref 135–145)
SP GR SPEC: 1.02 — SIGNIFICANT CHANGE UP (ref 1–1.03)
UROBILINOGEN FLD QL: 0.2 E.U./DL — SIGNIFICANT CHANGE UP
WBC # BLD: 10.89 K/UL — HIGH (ref 3.8–10.5)
WBC # FLD AUTO: 10.89 K/UL — HIGH (ref 3.8–10.5)
WBC UR QL: ABNORMAL /HPF

## 2020-11-04 PROCEDURE — 73502 X-RAY EXAM HIP UNI 2-3 VIEWS: CPT | Mod: 26,LT

## 2020-11-04 PROCEDURE — 99232 SBSQ HOSP IP/OBS MODERATE 35: CPT | Mod: GC

## 2020-11-04 PROCEDURE — 71045 X-RAY EXAM CHEST 1 VIEW: CPT | Mod: 26

## 2020-11-04 PROCEDURE — 99285 EMERGENCY DEPT VISIT HI MDM: CPT | Mod: CS

## 2020-11-04 PROCEDURE — 93010 ELECTROCARDIOGRAM REPORT: CPT

## 2020-11-04 PROCEDURE — 73552 X-RAY EXAM OF FEMUR 2/>: CPT | Mod: 26,LT

## 2020-11-04 RX ORDER — POLYETHYLENE GLYCOL 3350 17 G/17G
17 POWDER, FOR SOLUTION ORAL DAILY
Refills: 0 | Status: DISCONTINUED | OUTPATIENT
Start: 2020-11-04 | End: 2020-11-05

## 2020-11-04 RX ORDER — SACCHAROMYCES BOULARDII 250 MG
250 POWDER IN PACKET (EA) ORAL
Refills: 0 | Status: DISCONTINUED | OUTPATIENT
Start: 2020-11-04 | End: 2020-11-10

## 2020-11-04 RX ORDER — DIPHENHYDRAMINE HCL 50 MG
50 CAPSULE ORAL EVERY 6 HOURS
Refills: 0 | Status: DISCONTINUED | OUTPATIENT
Start: 2020-11-04 | End: 2020-11-09

## 2020-11-04 RX ORDER — METOCLOPRAMIDE HCL 10 MG
10 TABLET ORAL EVERY 6 HOURS
Refills: 0 | Status: DISCONTINUED | OUTPATIENT
Start: 2020-11-04 | End: 2020-11-10

## 2020-11-04 RX ORDER — ONDANSETRON 8 MG/1
4 TABLET, FILM COATED ORAL EVERY 6 HOURS
Refills: 0 | Status: DISCONTINUED | OUTPATIENT
Start: 2020-11-04 | End: 2020-11-05

## 2020-11-04 RX ORDER — POVIDONE-IODINE 5 %
1 AEROSOL (ML) TOPICAL ONCE
Refills: 0 | Status: DISCONTINUED | OUTPATIENT
Start: 2020-11-04 | End: 2020-11-10

## 2020-11-04 RX ORDER — SODIUM CHLORIDE 9 MG/ML
1000 INJECTION INTRAMUSCULAR; INTRAVENOUS; SUBCUTANEOUS
Refills: 0 | Status: DISCONTINUED | OUTPATIENT
Start: 2020-11-04 | End: 2020-11-05

## 2020-11-04 RX ORDER — CHLORHEXIDINE GLUCONATE 213 G/1000ML
1 SOLUTION TOPICAL EVERY 12 HOURS
Refills: 0 | Status: COMPLETED | OUTPATIENT
Start: 2020-11-04 | End: 2020-11-04

## 2020-11-04 RX ORDER — TRAMADOL HYDROCHLORIDE 50 MG/1
25 TABLET ORAL EVERY 6 HOURS
Refills: 0 | Status: DISCONTINUED | OUTPATIENT
Start: 2020-11-04 | End: 2020-11-04

## 2020-11-04 RX ORDER — HEPARIN SODIUM 5000 [USP'U]/ML
5000 INJECTION INTRAVENOUS; SUBCUTANEOUS EVERY 8 HOURS
Refills: 0 | Status: COMPLETED | OUTPATIENT
Start: 2020-11-04 | End: 2020-11-04

## 2020-11-04 RX ORDER — HYDROCHLOROTHIAZIDE 25 MG
12.5 TABLET ORAL ONCE
Refills: 0 | Status: COMPLETED | OUTPATIENT
Start: 2020-11-04 | End: 2020-11-04

## 2020-11-04 RX ORDER — TRAMADOL HYDROCHLORIDE 50 MG/1
50 TABLET ORAL EVERY 4 HOURS
Refills: 0 | Status: DISCONTINUED | OUTPATIENT
Start: 2020-11-04 | End: 2020-11-05

## 2020-11-04 RX ORDER — LOSARTAN POTASSIUM 100 MG/1
50 TABLET, FILM COATED ORAL DAILY
Refills: 0 | Status: DISCONTINUED | OUTPATIENT
Start: 2020-11-04 | End: 2020-11-06

## 2020-11-04 RX ORDER — AMLODIPINE BESYLATE 2.5 MG/1
2.5 TABLET ORAL DAILY
Refills: 0 | Status: DISCONTINUED | OUTPATIENT
Start: 2020-11-04 | End: 2020-11-05

## 2020-11-04 RX ORDER — TRAMADOL HYDROCHLORIDE 50 MG/1
25 TABLET ORAL EVERY 4 HOURS
Refills: 0 | Status: DISCONTINUED | OUTPATIENT
Start: 2020-11-04 | End: 2020-11-05

## 2020-11-04 RX ADMIN — SODIUM CHLORIDE 100 MILLILITER(S): 9 INJECTION INTRAMUSCULAR; INTRAVENOUS; SUBCUTANEOUS at 22:52

## 2020-11-04 RX ADMIN — AMLODIPINE BESYLATE 2.5 MILLIGRAM(S): 2.5 TABLET ORAL at 22:52

## 2020-11-04 RX ADMIN — Medication 12.5 MILLIGRAM(S): at 14:25

## 2020-11-04 RX ADMIN — AMLODIPINE BESYLATE 2.5 MILLIGRAM(S): 2.5 TABLET ORAL at 13:25

## 2020-11-04 RX ADMIN — LOSARTAN POTASSIUM 50 MILLIGRAM(S): 100 TABLET, FILM COATED ORAL at 19:18

## 2020-11-04 RX ADMIN — POLYETHYLENE GLYCOL 3350 17 GRAM(S): 17 POWDER, FOR SOLUTION ORAL at 13:25

## 2020-11-04 RX ADMIN — TRAMADOL HYDROCHLORIDE 25 MILLIGRAM(S): 50 TABLET ORAL at 13:18

## 2020-11-04 RX ADMIN — Medication 10 MILLIGRAM(S): at 14:49

## 2020-11-04 RX ADMIN — TRAMADOL HYDROCHLORIDE 25 MILLIGRAM(S): 50 TABLET ORAL at 13:45

## 2020-11-04 RX ADMIN — Medication 50 MILLIGRAM(S): at 22:52

## 2020-11-04 RX ADMIN — TRAMADOL HYDROCHLORIDE 25 MILLIGRAM(S): 50 TABLET ORAL at 06:42

## 2020-11-04 RX ADMIN — HEPARIN SODIUM 5000 UNIT(S): 5000 INJECTION INTRAVENOUS; SUBCUTANEOUS at 13:18

## 2020-11-04 RX ADMIN — ONDANSETRON 4 MILLIGRAM(S): 8 TABLET, FILM COATED ORAL at 02:24

## 2020-11-04 RX ADMIN — HEPARIN SODIUM 5000 UNIT(S): 5000 INJECTION INTRAVENOUS; SUBCUTANEOUS at 22:52

## 2020-11-04 RX ADMIN — CHLORHEXIDINE GLUCONATE 1 APPLICATION(S): 213 SOLUTION TOPICAL at 06:41

## 2020-11-04 RX ADMIN — SODIUM CHLORIDE 100 MILLILITER(S): 9 INJECTION INTRAMUSCULAR; INTRAVENOUS; SUBCUTANEOUS at 03:52

## 2020-11-04 NOTE — ED ADULT NURSE NOTE - MUSCULOSKELETAL ASSESSMENT
Care Suites Procedure Nursing Note    Procedure: US guided paracentesis  Procedure started time:0802  Procedure completed time: 0845  Concerns/abnormal assessment: Tolerated procedure well. Drained 4250 yellow fluid from right abd. Site glued and dressed after pressure held. VSS.   Plan: Back to floor.   - - -

## 2020-11-04 NOTE — CONSULT NOTE ADULT - SUBJECTIVE AND OBJECTIVE BOX
Attending: Quan Fall    Patient is a 89y old  Female who presents with a chief complaint of Left hip fx    HPI:  89yo woman with a PMH of HTN, osteoporosis and anxiety presenting after transfer from [facility] with L hip pain and inability to bear weight for approx 1 week after feeling a sharp pain in the hip while opening her shower door. Denies any fall or trauma. Pt had L IT fracture treated with IMN, now s/p removal of hardware, core decompression, abductor repair and bone graft with cells with Dr. Rodríguez on Sept 3 2020. Pt states she had been ambulating fine until the recent incident. Denies any numbness/tingling in LLE, denies pain or injury elsewhere. (04 Nov 2020 01:34)    Review of systems negative except per HPI    PAST MEDICAL & SURGICAL HISTORY:  Anxiety    Osteoporosis    HTN (hypertension)    H/O eye surgery    History of hip surgery    History of appendectomy      Home Medications:  amLODIPine 2.5 mg oral tablet: 1 tab(s) orally once a day (02 Sep 2020 15:26)  irbesartan 150 mg oral tablet: 1 tab(s) orally once a day (02 Sep 2020 15:26)    Allergies    carvedilol (Unknown)  codeine (Unknown)  conjugated estrogens (Unknown)  doxycycline (Unknown)  Levaquin (Unknown)  lubiprostone (Unknown)  nitrofurantoin (Unknown)  nortriptyline (Unknown)  penicillin (Unknown)  Tylenol (Rash)    Intolerances      LABS                        12.0   10.89 )-----------( 355      ( 04 Nov 2020 07:16 )             36.7     11-04    133<L>  |  96  |  14  ----------------------------<  111<H>  3.7   |  26  |  0.56    Ca    9.0      04 Nov 2020 07:16    TPro  5.9<L>  /  Alb  3.2<L>  /  TBili  0.2  /  DBili  <0.2  /  AST  655<H>  /  ALT  505<H>  /  AlkPhos  274<H>  11-04    PT/INR - ( 04 Nov 2020 00:41 )   PT: 12.2 sec;   INR: 1.02          PTT - ( 04 Nov 2020 00:41 )  PTT:32.1 sec    Vital Signs Last 24 Hrs  T(C): 36.8 (04 Nov 2020 05:48), Max: 36.8 (04 Nov 2020 02:28)  T(F): 98.2 (04 Nov 2020 05:48), Max: 98.2 (04 Nov 2020 02:28)  HR: 92 (04 Nov 2020 05:48) (88 - 92)  BP: 163/71 (04 Nov 2020 05:48) (126/88 - 163/71)  BP(mean): --  RR: 16 (04 Nov 2020 05:48) (16 - 18)  SpO2: 94% (04 Nov 2020 05:48) (94% - 98%)    PHYSICAL EXAM  General: NAD, AA0x3  Pulm: normal resp effort  Abd: soft, NT/ND  Ext: WWP bilaterally, no edema, L hip w/ dressing CDI, palpable DP and PT pulses, Popliteal, and femoral bilaterally, B/L varicosities from feet to knee

## 2020-11-04 NOTE — ED PROVIDER NOTE - OBJECTIVE STATEMENT
89 yo fem s/p left hip revision, felt sudden pain in low back/hip region while opening shower door apx 1 week ago; has been ambulatory with pain, today went to [facility] ED where XR showed displaced left hip fx.  Transferred to [facility] for care by Dr Rodríguez.     PMHx HTN, sciatica  PSHx appendectomy, uterine prolapse, left hip replacement/revision  Meds: irbesartan 150, amlodipine 2.5, HCTZ 12.5, aspirin 81 89 yo fem s/p left hip revision, felt sudden pain in low back/hip region while opening shower door apx 1 week ago; has been ambulatory with pain, today went to [facility] ED where XR showed displaced left hip fx.  Transferred to [facility] for care by Dr Rodríguez. No fall or head/neck injury.    PMHx HTN, sciatica  PSHx appendectomy, uterine prolapse, left hip replacement/revision  Meds: irbesartan 150, amlodipine 2.5, HCTZ 12.5, aspirin 81

## 2020-11-04 NOTE — DISCHARGE NOTE PROVIDER - NSDCCPCAREPLAN_GEN_ALL_CORE_FT
PRINCIPAL DISCHARGE DIAGNOSIS  Diagnosis: Closed fracture of left hip, initial encounter  Assessment and Plan of Treatment:

## 2020-11-04 NOTE — ED CLERICAL - NS ED CLERK NOTE PRE-ARRIVAL INFORMATION; ADDITIONAL PRE-ARRIVAL INFORMATION
from [facility]. Dr. roper ortho aware, wanted pt to go throuh ER. Recent hip revision. now w/ fx. from [facility]. Dr. rodríguez ortho aware, wanted pt to go [facility] ER. Recent hip revision. now w/ fx.  L hip pain --> L hip fx (Dr Rodríguez-- [facility]) COVID Neg at [facility]

## 2020-11-04 NOTE — ED ADULT NURSE NOTE - OBJECTIVE STATEMENT
pt a&ox3 transferred from [facility]. pt daughter at bedside. pt reports recent removal of L hip hardware, 9/2020. this morning, pt was closing her shower door when she twisted and felt pain. hip fx pt a&ox3 transferred from [facility]. pt daughter at bedside. pt reports recent removal of L hip hardware, 9/2020. 1 week ago, pt was closing her shower door when she twisted and felt pain. pt was able to ambulate prior to pain 1 week ago. hip fx confirmed via xray at [facility]. denies cp, sob, n ,v d, fever,s cough, weakness, ha, numbness tingling. pt updated on plan of care and policies of [facility], need for new bw, imaging and covid swab.

## 2020-11-04 NOTE — DISCHARGE NOTE PROVIDER - NSDCFUADDINST_GEN_ALL_CORE_FT
50% protected weight on operated hip using a rolling walker  Anterior and posterior hip precautions to prevent hip dislocation  No crossing your legs. Do not bend past your waist.  Use long-handled reach to pick things up if purchased.  Sit in a high chair.  If you do not have a high chair, use cushions on the chair  No strenuous activity, heavy lifting, driving or returning to work until cleared by MD.  You may shower - dressing is water-resistant, no soaking in bathtubs.  Keep dressing on your hip until the follow up appointment.  Try to have regular bowel movements, take stool softener or laxative if necessary.  May take Pepcid or Zantac for upset stomach.  Swelling may travel all the way down leg to foot, this is normal and will subside in a few weeks.  Call to schedule an appt with Dr. Rodríguez for follow up.    Contact your doctor if you experience: fever greater than 101.5, chills, chest pain, difficulty breathing, redness or excessive drainage around the incision, other concerns.  Follow up with your primary care provider.   Protected weight on operated hip using a rolling walker  Anterior hip precautions to prevent hip dislocation  No crossing your legs. Do not bend past your waist.  Use long-handled reach to pick things up if purchased.  Sit in a high chair.  If you do not have a high chair, use cushions on the chair  No strenuous activity, heavy lifting, driving or returning to work until cleared by MD.  You may shower - dressing is water-resistant, no soaking in bathtubs.  Keep dressing on your hip until the follow up appointment.  Try to have regular bowel movements, take stool softener or laxative if necessary.  May take Pepcid or Zantac for upset stomach.  Swelling may travel all the way down leg to foot, this is normal and will subside in a few weeks.  Call to schedule an appt with Dr. Rodríguez for follow up.    Contact your doctor if you experience: fever greater than 101.5, chills, chest pain, difficulty breathing, redness or excessive drainage around the incision, other concerns.  Follow up with your primary care provider.

## 2020-11-04 NOTE — DISCHARGE NOTE PROVIDER - NSDCMRMEDTOKEN_GEN_ALL_CORE_FT
amLODIPine 2.5 mg oral tablet: 1 tab(s) orally once a day  aspirin 81 mg oral delayed release tablet: 1 tab(s) orally 2 times a day for 4 weeks  irbesartan 150 mg oral tablet: 1 tab(s) orally once a day  traMADol 50 mg oral tablet: 0.5- 1 tab orally every 6 hours as needed for moderate to severe pain MDD:4   amLODIPine 2.5 mg oral tablet: 1 tab(s) orally twice daily  aspirin 81 mg oral delayed release tablet: 1 tab(s) orally 2 times a day for 4 weeks  irbesartan 150 mg oral tablet: 1 tab(s) orally once a day  traMADol 50 mg oral tablet: 0.5- 1 tab orally every 6 hours as needed for moderate to severe pain MDD:4   amLODIPine 2.5 mg oral tablet: 1 tab(s) orally twice daily  aspirin 81 mg oral delayed release tablet: 1 tab(s) orally 2 times a day for 4 weeks  bisacodyl 10 mg rectal suppository: 1 suppository(ies) rectal once a day, As needed, If no bowel movement by POD#2  celecoxib 200 mg oral capsule: 1 cap(s) orally 2 times a day  irbesartan 150 mg oral tablet: 1 tab(s) orally once a day  lactulose 10 g/15 mL oral syrup: 15 milliliter(s) orally once a day, As needed, constipation  mineral oil oral liquid: 30 milliliter(s) orally once a day, As needed, constipation  ocular lubricant ophthalmic solution: 1 drop(s) to each affected eye 3 times a day, As needed, Dry Eyes  oxyCODONE 10 mg oral tablet: 1 tab(s) orally every 4 hours, As needed, Severe Pain (7 - 10)  oxyCODONE 5 mg oral tablet: 1 tab(s) orally every 4 hours, As needed, Moderate Pain (4 - 6)  pantoprazole 40 mg oral delayed release tablet: 1 tab(s) orally once a day (before a meal)  polyethylene glycol 3350 oral powder for reconstitution: 17 gram(s) orally 2 times a day  saccharomyces boulardii lyo 250 mg oral capsule: 1 cap(s) orally 2 times a day  senna oral tablet: 2 tab(s) orally once a day (at bedtime), As needed, Constipation

## 2020-11-04 NOTE — ED ADULT NURSE NOTE - CHPI ED NUR SYMPTOMS NEG
no stiffness/no abrasion/no numbness/no tingling/no fever/no back pain/no bruising/no deformity/no weakness

## 2020-11-04 NOTE — CONSULT NOTE ADULT - ASSESSMENT
89yo woman with a PMH of HTN, osteoporosis and anxiety presenting with a Left femoral neck fracture.  PT has B/L varicosities and is known to Dr. Fall.    P:   89yo woman with a PMH of HTN, osteoporosis and anxiety presenting with a Left femoral neck fracture.  PT has B/L varicosities and is known to Dr. Fall.    P:  - patient does not currently require intervention for varicose veins this hospitalization   - vascular surgery service will follow

## 2020-11-04 NOTE — PROGRESS NOTE ADULT - SUBJECTIVE AND OBJECTIVE BOX
Ortho Note    Pt seen and examined with daughter at bedside. C/o no BM x 4 days. Reports h/o constipation/ ileus  with dilaudid/ strong narcotics in past, as well as sensitivity to oral laxatives other than miralax. Pain well controlled.   Denies CP, SOB, N/V, numbness/ tingling    Vital Signs Last 24 Hrs  T(C): --  T(F): --  HR: --  BP: --  BP(mean): --  RR: --  SpO2: --  AVSS    General: Pt Alert and oriented, NAD  LLE shortened  Pulses: +DP, WWP feet  Sensation: SILT BLE  Motor: 5/5 EHL/FHL/TA/GS                          12.0   10.89 )-----------( 355      ( 04 Nov 2020 07:16 )             36.7   04 Nov 2020 11:03    136    |  96     |  12     ----------------------------<  104    4.2     |  30     |  0.56     Ca    9.2        04 Nov 2020 11:03    TPro  6.1    /  Alb  3.6    /  TBili  0.2    /  DBili  <0.2   /  AST  464    /  ALT  473    /  AlkPhos  277    04 Nov 2020 11:03    A/P :  Pt is a 89yo Female with L FNF s/o EDVIN IMN, core decompression, bone graft on 9/3  - medicine pre-op clearance/ optimization  - transaminitis- appreciate medicine recs- r/o tylenol toxicity vs hepatitis; will consider U/S if not improving without tylenol use and hep panel negative  - Pain Control- tramadol PRN; avoid tylenol  - DVT ppx: HSQ, hold for OR tomorrow  - PT, WBS: NWB LLE  - OOB for meals, I/S  - bowel regimen- suppository to be given today, then miralax qd + probiotics  - dispo: OR tomorrow with Dr. Rodríguez    Ortho Pager [phone]

## 2020-11-04 NOTE — PROGRESS NOTE ADULT - ASSESSMENT
Pt is a 89 y/o F w/ pmhx of HTN, osteoporosis s/p left femur intramedullary nail removal 9/3 presenting with left femoral neck fracture pending left hip replacement.     #Preoperative clearance  EKG showing sinus rhythm with PACs 90 bpm without acute ischemic changes and with normal intervals  - pt with no active cardiac conditions, including unstable coronary syndrome, decompensated CHF, significant arrhythmias, or severe valvular disease  - RCRI score of 0, which equates to a 3.9% 30-day risk of death, MI, or cardiac arrest  - Garcia Perioperative Risk 0.5%  - Pt is a low risk pending an intermediate-risk procedure with an RCRI score of 0. Given that pt has good functional capacity with METs >=4 (functional capacity limited 2/2 to pain however able to walk around house without any CP or SOB), pt does not need further cardiac testing prior to OR.    #HTN  - c/w hctz 12.5 mg QD and amlodipine 2.5 mg QD  - hold irbesartan 150 QD on day of surgery    #Transaminitis   AST//219 -> 655/505, Alk phos 249 -> 274. TBili wnl. Pt without jaundice or AMS. Pt states she was recently taking two extra strength tylenol every 4 hours for her hip pain. Transaminitis may be 2/2 acetaminophen toxicity vs hepatitis vs less likely Budd-chiari vs malignancy vs AI disease vs myopathy.  - obtain GGT, hep panel, consider abd U/S  - trend LFTs  - avoid acetaminophen Pt is a 89 y/o F w/ pmhx of HTN, osteoporosis s/p left femur intramedullary nail removal 9/3 presenting with left femoral neck fracture pending left hip replacement.     #Preoperative clearance  EKG showing sinus rhythm with PACs 90 bpm without acute ischemic changes and with normal intervals  - pt with no active cardiac conditions, including unstable coronary syndrome, decompensated CHF, significant arrhythmias, or severe valvular disease  - RCRI score of 0, which equates to a 3.9% 30-day risk of death, MI, or cardiac arrest  - Garcia Perioperative Risk 0.5%  - Pt is a low risk pending an intermediate-risk procedure with an RCRI score of 0. Given that pt has good functional capacity with METs >=4 (functional capacity limited 2/2 to pain however able to walk around house without any CP or SOB), pt does not need further cardiac testing prior to OR.    #HTN  - c/w hctz 12.5 mg QD and amlodipine 2.5 mg QD  - hold irbesartan 150 QD on day of surgery    #Transaminitis   AST//219 -> 655/505, Alk phos 249 -> 274. TBili wnl. Pt without jaundice or AMS. Pt states she was recently taking two extra strength tylenol every 4 hours for her hip pain. Transaminitis may be 2/2 acetaminophen toxicity vs hepatitis vs less likely Budd-chiari vs malignancy vs AI disease vs myopathy.  - obtain acetaminophen level  - obtain RUQ US  - obtain GGT, hep panel  - trend LFTs  - avoid acetaminophen Pt is a 89 y/o F w/ pmhx of HTN, osteoporosis s/p left femur intramedullary nail removal 9/3 presenting with left femoral neck fracture pending left hip replacement.     #Preoperative clearance  EKG showing sinus rhythm with PACs 90 bpm without acute ischemic changes and with normal intervals  - pt with no active cardiac conditions, including unstable coronary syndrome, decompensated CHF, significant arrhythmias, or severe valvular disease  - RCRI score of 0, which equates to a 3.9% 30-day risk of death, MI, or cardiac arrest  - Garcia Perioperative Risk 0.5%  - Pt is a low risk pending an intermediate-risk procedure with an RCRI score of 0. Given that pt has good functional capacity with METs >=4 (functional capacity limited 2/2 to pain however able to walk around house without any CP or SOB), pt does not need further cardiac testing prior to OR.    #HTN  - c/w hctz 12.5 mg QD and amlodipine 2.5 mg QD  - hold irbesartan 150 QD on day of surgery    #Transaminitis   AST//219 -> 655/505, Alk phos 249 -> 274. TBili wnl. Hepatitis panel negative. Pt without jaundice or AMS. Pt states she was recently taking two extra strength tylenol every 4 hours for her hip pain. Transaminitis may be 2/2 acetaminophen toxicity vs less likely Budd-chiari vs malignancy vs AI disease vs myopathy.  - obtain acetaminophen level  - obtain RUQ US  - obtain GGT  - trend LFTs  - avoid acetaminophen    Case discussed with Dr. Gates

## 2020-11-04 NOTE — H&P ADULT - ASSESSMENT
89F PMH HTN s/p removal of L femur IMN 9/3 now with L FNF.  - Pain control  - NPO/IVF  - Hold anticoagulation for OR  - Labs  - PT/INR, T+S  - CXR  - EKG  - UA  - Medical clearance  - MRSA nasal swab  - Discussed with attending

## 2020-11-04 NOTE — PROGRESS NOTE ADULT - SUBJECTIVE AND OBJECTIVE BOX
SUBJECTIVE: Patient seen and examined. Pain controlled.  No issues overnight. No HA, f/c/n/v/cp/sob.     OBJECTIVE:  NAD  Vital Signs Last 24 Hrs  T(C): 36.8 (04 Nov 2020 05:48), Max: 36.8 (04 Nov 2020 02:28)  T(F): 98.2 (04 Nov 2020 05:48), Max: 98.2 (04 Nov 2020 02:28)  HR: 92 (04 Nov 2020 05:48) (88 - 92)  BP: 163/71 (04 Nov 2020 05:48) (126/88 - 163/71)  BP(mean): --  RR: 16 (04 Nov 2020 05:48) (16 - 18)  SpO2: 94% (04 Nov 2020 05:48) (94% - 98%)    Physical Exam:   General: Resting comfortably, NAD  LLE: Leg shortened.  SILT L2-S1 symmetrical. TA/EHL/FHL/GS motor intact          warm well perfused; capillary refill <3 seconds                Labs:             12.0   10.89 )-----------( 355      ( 04 Nov 2020 07:16 )             36.7     11-04    133<L>  |  96  |  14  ----------------------------<  111<H>  3.7   |  26  |  0.56    Ca    9.0      04 Nov 2020 07:16    TPro  5.9<L>  /  Alb  3.2<L>  /  TBili  0.2  /  DBili  <0.2  /  AST  655<H>  /  ALT  505<H>  /  AlkPhos  274<H>  11-04      A/P :  Pt is a 89yo Female with L FNF s/o EDVIN IMN, core decompression, bone graft on 9/3  -    Pain control  -    DVT ppx: SCDs     -    Weight bearing status: NWB LLE  -    Physical Therapy  -    Dispo: OR 11/4   SUBJECTIVE: Patient seen and examined. Pain controlled.  No issues overnight. No HA, f/c/n/v/cp/sob.     OBJECTIVE:  NAD  Vital Signs Last 24 Hrs  T(C): 36.8 (04 Nov 2020 05:48), Max: 36.8 (04 Nov 2020 02:28)  T(F): 98.2 (04 Nov 2020 05:48), Max: 98.2 (04 Nov 2020 02:28)  HR: 92 (04 Nov 2020 05:48) (88 - 92)  BP: 163/71 (04 Nov 2020 05:48) (126/88 - 163/71)  BP(mean): --  RR: 16 (04 Nov 2020 05:48) (16 - 18)  SpO2: 94% (04 Nov 2020 05:48) (94% - 98%)    Physical Exam:   General: Resting comfortably, NAD  LLE: Leg shortened.  SILT L2-S1 symmetrical. TA/EHL/FHL/GS motor intact          warm well perfused; capillary refill <3 seconds                Labs:             12.0   10.89 )-----------( 355      ( 04 Nov 2020 07:16 )             36.7     11-04    133<L>  |  96  |  14  ----------------------------<  111<H>  3.7   |  26  |  0.56    Ca    9.0      04 Nov 2020 07:16    TPro  5.9<L>  /  Alb  3.2<L>  /  TBili  0.2  /  DBili  <0.2  /  AST  655<H>  /  ALT  505<H>  /  AlkPhos  274<H>  11-04      A/P :  Pt is a 89yo Female with L FNF s/o EDVIN IMN, core decompression, bone graft on 9/3  -    Pain control  -    DVT ppx: SCDs     -    Weight bearing status: NWB LLE  -    Physical Therapy  -    F/u hepatic panel + labs  -    Dispo: OR 11/4

## 2020-11-04 NOTE — H&P ADULT - NSHPPHYSICALEXAM_GEN_ALL_CORE
Physical Exam:  General: Resting comfortably in bed. AAOx3. NAD.  Extremities:        LLE: Leg significantly shortened. TTP groin, GT. SILT L2-S1 distribution, symmetric. TA/EHL/FHL/GS motor intact. WWP, DP 2+       RLE: No gross deformity. SILT L2-S1 distribution, symmetric. TA/EHL/FHL/GS motor intact. WWP, DP 2+

## 2020-11-04 NOTE — DISCHARGE NOTE PROVIDER - NSDCFUSCHEDAPPT_GEN_ALL_CORE_FT
CASE DUENAS ; 11/05/2020 ; [facility] [address]  CASE DUENAS ; 12/01/2020 ; [facility] [address] CASE DUENAS ; 12/01/2020 ; [facility] [address]

## 2020-11-04 NOTE — ED PROVIDER NOTE - ATTENDING CONTRIBUTION TO CARE
avss. nontoxic. NAD. found to have atraumatic L periprosthetic fx on xray. neurovasc intact. preop labs/ekg/cxr w/o acute abnl. ortho had accepted transfer. will admit per reccs.    i saw and discussed the care of the pt directly with the ACP while the pt was in the ED. i have reviewed the ACP note and agree w/ the history, exam and plan of care other than as noted above.

## 2020-11-04 NOTE — PROGRESS NOTE ADULT - SUBJECTIVE AND OBJECTIVE BOX
OVERNIGHT EVENTS: Admitted overnight, medicine consulted for preoperative clearance and evaluation of transaminitis    SUBJECTIVE: Pt seen and examined at bedside. She is anxious, has many questions regarding next steps regarding her hip. Denies fever, chills, headache, chest pain, shortness of breath, abdominal pain, n/v/d/c, numbness, weakness, tingling.     Vital Signs Last 12 Hrs  T(F): 98.2 (20 @ 05:48), Max: 98.2 (20 @ 02:28)  HR: 92 (20 @ 05:48) (88 - 92)  BP: 163/71 (20 @ 05:48) (126/88 - 163/71)  BP(mean): --  RR: 16 (20 @ 05:48) (16 - 18)  SpO2: 94% (20 @ 05:48) (94% - 98%)  I&O's Summary      PHYSICAL EXAM:  General: WDWN, NAD, resting comfortably in bed  HEENT: NC/AT; anicteric sclera; MMM  Neck: supple  Cardiovascular: +S1/S2; RRR; 2/6 systolic murmur best heard at LUSB  Respiratory: CTA B/L; no W/R/R  Gastrointestinal: soft, NT/ND; +BSx4  Extremities: WWP; no edema, clubbing or cyanosis. Tenderness to palpation to left hip,   Vascular: 2+ radial, DP/PT pulses B/L  Neurological: AAOx3        LABS:                        12.0   10.89 )-----------( 355      ( 2020 07:16 )             36.7         136  |  96  |  12  ----------------------------<  104<H>  4.2   |  30  |  0.56    Ca    9.2      2020 11:03    TPro  6.1  /  Alb  3.6  /  TBili  0.2  /  DBili  <0.2  /  AST  464<H>  /  ALT  473<H>  /  AlkPhos  277<H>  11    PT/INR - ( 2020 00:41 )   PT: 12.2 sec;   INR: 1.02          PTT - ( 2020 00:41 )  PTT:32.1 sec  Urinalysis Basic - ( 2020 02:29 )    Color: Yellow / Appearance: Clear / S.025 / pH: x  Gluc: x / Ketone: NEGATIVE  / Bili: Negative / Urobili: 0.2 E.U./dL   Blood: x / Protein: NEGATIVE mg/dL / Nitrite: NEGATIVE   Leuk Esterase: Small / RBC: < 5 /HPF / WBC 5-10 /HPF   Sq Epi: x / Non Sq Epi: x / Bacteria: Present /HPF        RADIOLOGY & ADDITIONAL TESTS:        MEDICATIONS  (STANDING):  amLODIPine   Tablet 2.5 milliGRAM(s) Oral daily  chlorhexidine 2% Cloths 1 Application(s) Topical every 12 hours  povidone iodine 5% Nasal Swab 1 Application(s) Both Nostrils once  sodium chloride 0.9%. 1000 milliLiter(s) (100 mL/Hr) IV Continuous <Continuous>    MEDICATIONS  (PRN):  metoclopramide Injectable 10 milliGRAM(s) IV Push every 6 hours PRN Nausea and/or Vomiting, second line  ondansetron Injectable 4 milliGRAM(s) IV Push every 6 hours PRN Nausea and/or Vomiting  traMADol 50 milliGRAM(s) Oral every 4 hours PRN Severe Pain (7 - 10)  traMADol 25 milliGRAM(s) Oral every 4 hours PRN Moderate Pain (4 - 6)

## 2020-11-04 NOTE — CONSULT NOTE ADULT - SUBJECTIVE AND OBJECTIVE BOX
HEBERTWADE CASE  89y  Female      Patient is a 89y old  Female who presents with a chief complaint of   89yo woman with a PMH of HTN, osteoporosis and anxiety presenting after transfer from [facility] with L hip pain and inability to bear weight for approx 1 week after feeling a sharp pain in the hip while opening her shower door. Denies any fall or trauma. Pt had L IT fracture treated with IMN, now s/p removal of hardware, core decompression, abductor repair and bone graft with cells with Dr. Rodríguez on Sept 3 2020. Pt states she had been ambulating fine until the recent incident. Denies any numbness/tingling in LLE, denies pain or injury elsewhere.      PAST MEDICAL/SURGICAL HISTORY  PAST MEDICAL & SURGICAL HISTORY:  Anxiety    Osteoporosis    HTN (hypertension)    H/O eye surgery    History of hip surgery    History of appendectomy    History of cholecystectomy        REVIEW OF SYSTEMS:  CONSTITUTIONAL: No fever, weight loss, or fatigue  ENMT: No sinus or throat pain  NECK: No pain or stiffness  RESPIRATORY: No cough, wheezing, chills or hemoptysis; No shortness of breath  CARDIOVASCULAR: No chest pain, palpitations, dizziness, or leg swelling  GASTROINTESTINAL: No abdominal or epigastric pain. No nausea, vomiting, or hematemesis; No diarrhea. No melena or hematochezia.  GENITOURINARY: No dysuria, frequency, hematuria, or incontinence  NEUROLOGICAL: No headaches, memory loss, loss of strength, numbness, or tremors  ENDOCRINE: No heat or cold intolerance; No hair loss  MUSCULOSKELETAL: Left hip pain    T(C): 36.4 (11-04-20 @ 00:03), Max: 36.4 (11-04-20 @ 00:03)  HR: 88 (11-04-20 @ 00:03) (88 - 88)  BP: 126/88 (11-04-20 @ 00:03) (126/88 - 126/88)  RR: 18 (11-04-20 @ 00:03) (18 - 18)  SpO2: 98% (11-04-20 @ 00:03) (98% - 98%)  Wt(kg): --Vital Signs Last 24 Hrs  T(C): 36.4 (04 Nov 2020 00:03), Max: 36.4 (04 Nov 2020 00:03)  T(F): 97.6 (04 Nov 2020 00:03), Max: 97.6 (04 Nov 2020 00:03)  HR: 88 (04 Nov 2020 00:03) (88 - 88)  BP: 126/88 (04 Nov 2020 00:03) (126/88 - 126/88)  BP(mean): --  RR: 18 (04 Nov 2020 00:03) (18 - 18)  SpO2: 98% (04 Nov 2020 00:03) (98% - 98%)    PHYSICAL EXAM:    General: WDWN, NAD, resting comfortably in bed  HEENT: NC/AT; anicteric sclera; MMM  Neck: supple  Cardiovascular: +S1/S2; RRR; 2/6 systolic murmur best heard at LUSB  Respiratory: CTA B/L; no W/R/R  Gastrointestinal: soft, NT/ND; +BSx4  Extremities: WWP; no edema, clubbing or cyanosis. Tenderness to palpation to left hip,   Vascular: 2+ radial, DP/PT pulses B/L  Neurological: AAOx3      Consultant(s) Notes Reviewed:  [x ] YES  [ ] NO  Care Discussed with Consultants/Other Providers [ x] YES  [ ] NO      LABS:                         12.4   11.38 )-----------( 408      ( 04 Nov 2020 00:41 )             38.1     11-04    132<L>  |  94<L>  |  16  ----------------------------<  155<H>  3.7   |  25  |  0.58    Ca    9.0      04 Nov 2020 00:41    TPro  6.2  /  Alb  3.7  /  TBili  0.4  /  DBili  x   /  AST  399<H>  /  ALT  219<H>  /  AlkPhos  249<H>  11-04    PT/INR - ( 04 Nov 2020 00:41 )   PT: 12.2 sec;   INR: 1.02          PTT - ( 04 Nov 2020 00:41 )  PTT:32.1 sec              RADIOLOGY, EKG & ADDITIONAL TESTS: Reviewed.

## 2020-11-04 NOTE — H&P ADULT - HISTORY OF PRESENT ILLNESS
89yo woman with a PMH of HTN, osteoporosis and anxiety presenting after transfer from [facility] with L hip pain and inability to bear weight for approx 1 week after feeling a sharp pain in the hip while opening her shower door. Denies any fall or trauma. Pt had L IT fracture treated with IMN, now s/p removal of hardware, core decompression, abductor repair and bone graft with cells with Dr. Rodríguez on Sept 3 2020. Pt states she had been ambulating fine until the recent incident. Denies any numbness/tingling in LLE, denies pain or injury elsewhere.

## 2020-11-04 NOTE — DISCHARGE NOTE PROVIDER - HOSPITAL COURSE
Admitted 11/4/20  Pre-op Medical Clearance and Optimization  Transaminitis- likely r/t tylenol use. Hepatitis panel negative. Abd ultrasound.  Surgery 11/5/20  Janay-op Antibiotics  Pain control  DVT prophylaxis  OOB/Physical Therapy   Admitted 11/4/20  Pre-op Medical Clearance and Optimization  Transaminitis- likely r/t tylenol use. Hepatitis panel negative. Abd ultrasound WNL for age. Avoid Tylenol/ hepatotoxic medications  Constipation- Avoid narcotics unless necessary; continue miralax and senna daily; enemas and dulcolax PRN  Surgery 11/5/20  Janya-op Antibiotics  Pain control  DVT prophylaxis  OOB/Physical Therapy

## 2020-11-04 NOTE — CONSULT NOTE ADULT - ASSESSMENT
Pt is a 89 y/o F w/ pmhx of HTN, osteoporosis s/p left femur intramedullary nail removal 9/3 presenting with left femoral neck fracture pending left hip replacement.     #Preop  EKG showing sinus rhythm with PACs 90 bpm without acute ischemic changes and with normal intervals  - pt with no active cardiac conditions, including unstable coronary syndrome, decompensated CHF, significant arrhythmias, or severe valvular disease  - RCRI score of 0, which equates to a 3.9% 30-day risk of death, MI, or cardiac arrest  - Garcia Perioperative Risk 0.5%  - Pt is a low risk pending an intermediate-risk procedure with an RCRI score of 0. Given that pt has good functional capacity with METs >=4 (functional capacity limited 2/2 to pain however able to walk around house without any CP or SOB), pt does not need further cardiac testing prior to OR.    #HTN  - c/w hctz 12.5 mg QD and amlodipine 2.5 mg QD  - hold irbesartan 150 QD on day of surgery    #Transaminitis   - AST//219, Alk phos 249.   - pt denies abd pain, any hx of alcohol use, only took 1 tylenol a few days ago  - obtain GGT, hep panel, consider abd U/S  - trend LFTs Pt is a 89 y/o F w/ pmhx of HTN, osteoporosis s/p left femur intramedullary nail removal 9/3 presenting with left femoral neck fracture pending left hip replacement.     #Preop  EKG showing sinus rhythm with PACs 90 bpm without acute ischemic changes and with normal intervals  - pt with no active cardiac conditions, including unstable coronary syndrome, decompensated CHF, significant arrhythmias, or severe valvular disease  - RCRI score of 0, which equates to a 3.9% 30-day risk of death, MI, or cardiac arrest  - Garcia Perioperative Risk 0.5%  - Pt is a low risk pending an intermediate-risk procedure with an RCRI score of 0. Given that pt has good functional capacity with METs >=4 (functional capacity limited 2/2 to pain however able to walk around house without any CP or SOB), pt does not need further cardiac testing prior to OR.    #HTN  - c/w hctz 12.5 mg QD and amlodipine 2.5 mg QD  - hold irbesartan 150 QD on day of surgery    #Transaminitis   - AST//219, Alk phos 249.   - pt denies abd pain, any hx of alcohol use, only took 1 tylenol a few days ago, denies herbal supplements was taking probiotics a year ago  - obtain GGT, hep panel, consider abd U/S  - trend LFTs

## 2020-11-04 NOTE — ED PROVIDER NOTE - CLINICAL SUMMARY MEDICAL DECISION MAKING FREE TEXT BOX
Displaced left femoral neck fracture.  No evidence of impaired circulation to foot, neuro function grossly intact.  XRays done at [facility] cannot be uploaded to our system, will need to get new XR.  Ortho already aware.

## 2020-11-04 NOTE — DISCHARGE NOTE PROVIDER - CARE PROVIDER_API CALL
Cordell Rodríguez)  Orthopaedic Surgery  [address]  [place], [place] [address]  Phone: [phone]  Fax: [phone]  Follow Up Time: 2 weeks

## 2020-11-04 NOTE — DISCHARGE NOTE PROVIDER - CARE PROVIDERS DIRECT ADDRESSES
[email or web address]

## 2020-11-04 NOTE — ED PROVIDER NOTE - PHYSICAL EXAMINATION
CONSTITUTIONAL: WD,WN. NAD.    SKIN: Normal color and turgor. No rash.    HEAD: NC/AT.  EYES: Conjunctiva clear. EOMI. PERRL.    ENT: Airway patent, OP without erythema, tonsillar swelling or exudate; uvula midline without swelling. Nasal mucosa clear, no rhinorrhea.   RESPIRATORY:  Breathing non-labored. No retractions or accessory muscle use.  Lungs CTA bilat.  CARDIOVASCULAR:  Irregular, S1S2. No M/R/G.      GI:  Abdomen soft, nontender.    MSK: Neck supple with painless ROM.  LLE shortened.  No LE edema.  DP and PT pulses present.  Foot WWP.    NEURO: Alert and oriented; CN II-XII grossly intact. Speech clear. Able to wiggle toes.  SILT.

## 2020-11-05 ENCOUNTER — APPOINTMENT (OUTPATIENT)
Dept: ORTHOPEDIC SURGERY | Facility: HOSPITAL | Age: 85
End: 2020-11-05
Payer: MEDICARE

## 2020-11-05 LAB
ALBUMIN SERPL ELPH-MCNC: 3.1 G/DL — LOW (ref 3.3–5)
ALP SERPL-CCNC: 214 U/L — HIGH (ref 40–120)
ALT FLD-CCNC: 254 U/L — HIGH (ref 10–45)
ANION GAP SERPL CALC-SCNC: 10 MMOL/L — SIGNIFICANT CHANGE UP (ref 5–17)
AST SERPL-CCNC: 153 U/L — HIGH (ref 10–40)
BILIRUB DIRECT SERPL-MCNC: <0.2 MG/DL — SIGNIFICANT CHANGE UP (ref 0–0.2)
BILIRUB INDIRECT FLD-MCNC: SIGNIFICANT CHANGE UP (ref 0.2–1)
BILIRUB SERPL-MCNC: 0.2 MG/DL — SIGNIFICANT CHANGE UP (ref 0.2–1.2)
BUN SERPL-MCNC: 11 MG/DL — SIGNIFICANT CHANGE UP (ref 7–23)
CALCIUM SERPL-MCNC: 9.1 MG/DL — SIGNIFICANT CHANGE UP (ref 8.4–10.5)
CHLORIDE SERPL-SCNC: 96 MMOL/L — SIGNIFICANT CHANGE UP (ref 96–108)
CO2 SERPL-SCNC: 28 MMOL/L — SIGNIFICANT CHANGE UP (ref 22–31)
CREAT SERPL-MCNC: 0.55 MG/DL — SIGNIFICANT CHANGE UP (ref 0.5–1.3)
GGT SERPL-CCNC: 128 U/L — HIGH (ref 8–40)
GLUCOSE SERPL-MCNC: 90 MG/DL — SIGNIFICANT CHANGE UP (ref 70–99)
HAV IGM SER-ACNC: SIGNIFICANT CHANGE UP
HBV CORE IGM SER-ACNC: SIGNIFICANT CHANGE UP
HBV SURFACE AG SER-ACNC: SIGNIFICANT CHANGE UP
HCT VFR BLD CALC: 34.5 % — SIGNIFICANT CHANGE UP (ref 34.5–45)
HCV AB S/CO SERPL IA: 0.05 S/CO — SIGNIFICANT CHANGE UP
HCV AB SERPL-IMP: SIGNIFICANT CHANGE UP
HGB BLD-MCNC: 11.1 G/DL — LOW (ref 11.5–15.5)
MCHC RBC-ENTMCNC: 27.6 PG — SIGNIFICANT CHANGE UP (ref 27–34)
MCHC RBC-ENTMCNC: 32.2 GM/DL — SIGNIFICANT CHANGE UP (ref 32–36)
MCV RBC AUTO: 85.8 FL — SIGNIFICANT CHANGE UP (ref 80–100)
NRBC # BLD: 0 /100 WBCS — SIGNIFICANT CHANGE UP (ref 0–0)
PLATELET # BLD AUTO: 348 K/UL — SIGNIFICANT CHANGE UP (ref 150–400)
POTASSIUM SERPL-MCNC: 3.5 MMOL/L — SIGNIFICANT CHANGE UP (ref 3.5–5.3)
POTASSIUM SERPL-SCNC: 3.5 MMOL/L — SIGNIFICANT CHANGE UP (ref 3.5–5.3)
PROT SERPL-MCNC: 5.4 G/DL — LOW (ref 6–8.3)
RBC # BLD: 4.02 M/UL — SIGNIFICANT CHANGE UP (ref 3.8–5.2)
RBC # FLD: 12.8 % — SIGNIFICANT CHANGE UP (ref 10.3–14.5)
SODIUM SERPL-SCNC: 134 MMOL/L — LOW (ref 135–145)
VIT D25+D1,25 OH+D1,25 PNL SERPL-MCNC: 37.6 PG/ML — SIGNIFICANT CHANGE UP (ref 19.9–79.3)
WBC # BLD: 7.55 K/UL — SIGNIFICANT CHANGE UP (ref 3.8–10.5)
WBC # FLD AUTO: 7.55 K/UL — SIGNIFICANT CHANGE UP (ref 3.8–10.5)

## 2020-11-05 PROCEDURE — 99233 SBSQ HOSP IP/OBS HIGH 50: CPT | Mod: GC

## 2020-11-05 PROCEDURE — 27132 TOTAL HIP ARTHROPLASTY: CPT | Mod: 78,LT

## 2020-11-05 PROCEDURE — 72170 X-RAY EXAM OF PELVIS: CPT | Mod: 26,76

## 2020-11-05 RX ORDER — PANTOPRAZOLE SODIUM 20 MG/1
40 TABLET, DELAYED RELEASE ORAL
Refills: 0 | Status: DISCONTINUED | OUTPATIENT
Start: 2020-11-05 | End: 2020-11-10

## 2020-11-05 RX ORDER — FAMOTIDINE 10 MG/ML
20 INJECTION INTRAVENOUS EVERY 12 HOURS
Refills: 0 | Status: DISCONTINUED | OUTPATIENT
Start: 2020-11-05 | End: 2020-11-05

## 2020-11-05 RX ORDER — SODIUM CHLORIDE 9 MG/ML
1000 INJECTION, SOLUTION INTRAVENOUS
Refills: 0 | Status: DISCONTINUED | OUTPATIENT
Start: 2020-11-05 | End: 2020-11-08

## 2020-11-05 RX ORDER — MAGNESIUM HYDROXIDE 400 MG/1
30 TABLET, CHEWABLE ORAL DAILY
Refills: 0 | Status: DISCONTINUED | OUTPATIENT
Start: 2020-11-05 | End: 2020-11-10

## 2020-11-05 RX ORDER — AMLODIPINE BESYLATE 2.5 MG/1
1 TABLET ORAL
Qty: 0 | Refills: 0 | DISCHARGE

## 2020-11-05 RX ORDER — MORPHINE SULFATE 50 MG/1
2 CAPSULE, EXTENDED RELEASE ORAL EVERY 4 HOURS
Refills: 0 | Status: DISCONTINUED | OUTPATIENT
Start: 2020-11-05 | End: 2020-11-05

## 2020-11-05 RX ORDER — KETOROLAC TROMETHAMINE 30 MG/ML
15 SYRINGE (ML) INJECTION EVERY 6 HOURS
Refills: 0 | Status: DISCONTINUED | OUTPATIENT
Start: 2020-11-05 | End: 2020-11-06

## 2020-11-05 RX ORDER — CEFAZOLIN SODIUM 1 G
2000 VIAL (EA) INJECTION EVERY 8 HOURS
Refills: 0 | Status: COMPLETED | OUTPATIENT
Start: 2020-11-05 | End: 2020-11-06

## 2020-11-05 RX ORDER — POLYETHYLENE GLYCOL 3350 17 G/17G
17 POWDER, FOR SOLUTION ORAL DAILY
Refills: 0 | Status: DISCONTINUED | OUTPATIENT
Start: 2020-11-05 | End: 2020-11-07

## 2020-11-05 RX ORDER — HYDROCHLOROTHIAZIDE 25 MG
12.5 TABLET ORAL DAILY
Refills: 0 | Status: DISCONTINUED | OUTPATIENT
Start: 2020-11-05 | End: 2020-11-06

## 2020-11-05 RX ORDER — TRAMADOL HYDROCHLORIDE 50 MG/1
25 TABLET ORAL EVERY 6 HOURS
Refills: 0 | Status: DISCONTINUED | OUTPATIENT
Start: 2020-11-05 | End: 2020-11-05

## 2020-11-05 RX ORDER — HYDROMORPHONE HYDROCHLORIDE 2 MG/ML
0.5 INJECTION INTRAMUSCULAR; INTRAVENOUS; SUBCUTANEOUS EVERY 4 HOURS
Refills: 0 | Status: DISCONTINUED | OUTPATIENT
Start: 2020-11-05 | End: 2020-11-05

## 2020-11-05 RX ORDER — ONDANSETRON 8 MG/1
4 TABLET, FILM COATED ORAL EVERY 6 HOURS
Refills: 0 | Status: DISCONTINUED | OUTPATIENT
Start: 2020-11-05 | End: 2020-11-10

## 2020-11-05 RX ORDER — SENNA PLUS 8.6 MG/1
2 TABLET ORAL AT BEDTIME
Refills: 0 | Status: DISCONTINUED | OUTPATIENT
Start: 2020-11-05 | End: 2020-11-10

## 2020-11-05 RX ORDER — OXYCODONE HYDROCHLORIDE 5 MG/1
5 TABLET ORAL EVERY 4 HOURS
Refills: 0 | Status: DISCONTINUED | OUTPATIENT
Start: 2020-11-05 | End: 2020-11-06

## 2020-11-05 RX ORDER — CELECOXIB 200 MG/1
200 CAPSULE ORAL
Refills: 0 | Status: DISCONTINUED | OUTPATIENT
Start: 2020-11-05 | End: 2020-11-05

## 2020-11-05 RX ORDER — MORPHINE SULFATE 50 MG/1
4 CAPSULE, EXTENDED RELEASE ORAL EVERY 4 HOURS
Refills: 0 | Status: DISCONTINUED | OUTPATIENT
Start: 2020-11-05 | End: 2020-11-05

## 2020-11-05 RX ORDER — TRAMADOL HYDROCHLORIDE 50 MG/1
50 TABLET ORAL EVERY 6 HOURS
Refills: 0 | Status: DISCONTINUED | OUTPATIENT
Start: 2020-11-05 | End: 2020-11-05

## 2020-11-05 RX ORDER — OXYCODONE HYDROCHLORIDE 5 MG/1
2.5 TABLET ORAL EVERY 4 HOURS
Refills: 0 | Status: DISCONTINUED | OUTPATIENT
Start: 2020-11-05 | End: 2020-11-06

## 2020-11-05 RX ORDER — POVIDONE-IODINE 5 %
1 AEROSOL (ML) TOPICAL ONCE
Refills: 0 | Status: DISCONTINUED | OUTPATIENT
Start: 2020-11-05 | End: 2020-11-10

## 2020-11-05 RX ORDER — ASPIRIN/CALCIUM CARB/MAGNESIUM 324 MG
81 TABLET ORAL
Refills: 0 | Status: DISCONTINUED | OUTPATIENT
Start: 2020-11-05 | End: 2020-11-10

## 2020-11-05 RX ORDER — AMLODIPINE BESYLATE 2.5 MG/1
2.5 TABLET ORAL
Refills: 0 | Status: DISCONTINUED | OUTPATIENT
Start: 2020-11-05 | End: 2020-11-10

## 2020-11-05 RX ADMIN — Medication 81 MILLIGRAM(S): at 18:16

## 2020-11-05 RX ADMIN — Medication 250 MILLIGRAM(S): at 18:16

## 2020-11-05 RX ADMIN — Medication 250 MILLIGRAM(S): at 07:14

## 2020-11-05 RX ADMIN — OXYCODONE HYDROCHLORIDE 2.5 MILLIGRAM(S): 5 TABLET ORAL at 21:44

## 2020-11-05 RX ADMIN — POLYETHYLENE GLYCOL 3350 17 GRAM(S): 17 POWDER, FOR SOLUTION ORAL at 18:16

## 2020-11-05 RX ADMIN — LOSARTAN POTASSIUM 50 MILLIGRAM(S): 100 TABLET, FILM COATED ORAL at 06:08

## 2020-11-05 RX ADMIN — OXYCODONE HYDROCHLORIDE 2.5 MILLIGRAM(S): 5 TABLET ORAL at 17:08

## 2020-11-05 RX ADMIN — OXYCODONE HYDROCHLORIDE 2.5 MILLIGRAM(S): 5 TABLET ORAL at 18:08

## 2020-11-05 RX ADMIN — OXYCODONE HYDROCHLORIDE 2.5 MILLIGRAM(S): 5 TABLET ORAL at 22:08

## 2020-11-05 RX ADMIN — AMLODIPINE BESYLATE 2.5 MILLIGRAM(S): 2.5 TABLET ORAL at 06:08

## 2020-11-05 RX ADMIN — Medication 100 MILLIGRAM(S): at 19:00

## 2020-11-05 RX ADMIN — AMLODIPINE BESYLATE 2.5 MILLIGRAM(S): 2.5 TABLET ORAL at 18:16

## 2020-11-05 NOTE — PROGRESS NOTE ADULT - SUBJECTIVE AND OBJECTIVE BOX
SUBJECTIVE: Patient seen and examined. Pain controlled.  No issues overnight. No HA, f/c/n/v/cp/sob.     OBJECTIVE:  NAD  Vital Signs Last 24 Hrs  T(C): 36.6 (05 Nov 2020 06:10), Max: 36.9 (04 Nov 2020 21:21)  T(F): 97.9 (05 Nov 2020 06:10), Max: 98.4 (04 Nov 2020 21:21)  HR: 77 (05 Nov 2020 06:10) (72 - 86)  BP: 173/70 (05 Nov 2020 06:10) (158/70 - 179/75)  BP(mean): --  RR: 17 (05 Nov 2020 06:10) (15 - 18)  SpO2: 96% (05 Nov 2020 06:10) (94% - 96%)    Physical Exam:   General: Resting comfortably, NAD  LLE: Leg shortened.  SILT L2-S1 symmetrical. TA/EHL/FHL/GS motor intact          warm well perfused; capillary refill <3 seconds                Labs:                        11.1   7.55  )-----------( 348      ( 05 Nov 2020 06:08 )             34.5     11-05    134<L>  |  96  |  11  ----------------------------<  90  3.5   |  28  |  0.55    Ca    9.1      05 Nov 2020 06:08    TPro  5.4<L>  /  Alb  3.1<L>  /  TBili  0.2  /  DBili  <0.2  /  AST  153<H>  /  ALT  254<H>  /  AlkPhos  214<H>  11-05      A/P :  Pt is a 89yo Female with L FNF s/o EDVIN IMN, core decompression, bone graft on 9/3  -    Pain control  -    DVT ppx: SCDs     -    Weight bearing status: NWB LLE  -    Physical Therapy  -    F/u hepatic panel + labs  -    Dispo: OR 11/5

## 2020-11-05 NOTE — PROGRESS NOTE ADULT - SUBJECTIVE AND OBJECTIVE BOX
Orthopedics Post Op Check    Procedure: LEFT THR  Surgeon: JUWAN    Pt. stable, laying in bed comfortably in bed. Denies CP, SOB, N/V, numbness/tingling in b/l les.     Vital Signs Last 24 Hrs  T(C): 36.3 (05 Nov 2020 14:13), Max: 36.9 (04 Nov 2020 21:21)  T(F): 97.4 (05 Nov 2020 14:13), Max: 98.4 (04 Nov 2020 21:21)  HR: 78 (05 Nov 2020 14:28) (70 - 97)  BP: 164/68 (05 Nov 2020 14:28) (153/65 - 179/75)  BP(mean): 98 (05 Nov 2020 14:28) (94 - 98)  RR: 14 (05 Nov 2020 14:28) (13 - 19)  SpO2: 100% (05 Nov 2020 14:28) (87% - 100%)      Dressing C/D/I    Pulses: DP/PT 2+ B/L LES  SLT: intact B/L LES  Motor:  EHL/FHL/TA/GS 5/5 b/l les                            11.1   7.55  )-----------( 348      ( 05 Nov 2020 06:08 )             34.5   11-05    134<L>  |  96  |  11  ----------------------------<  90  3.5   |  28  |  0.55    Ca    9.1      05 Nov 2020 06:08    TPro  5.4<L>  /  Alb  3.1<L>  /  TBili  0.2  /  DBili  <0.2  /  AST  153<H>  /  ALT  254<H>  /  AlkPhos  214<H>  11-05    Post op XR: prosthesis in place     A/P: 89yoFemale POD#0 s/p left thr   - Stable  - Pain Control  - DVT ppx: asa  - Post op abx: ancef  - PT, WBS: wbat b/l les  - F/U AM Labs

## 2020-11-06 PROCEDURE — 74019 RADEX ABDOMEN 2 VIEWS: CPT | Mod: 26

## 2020-11-06 PROCEDURE — 99233 SBSQ HOSP IP/OBS HIGH 50: CPT | Mod: GC

## 2020-11-06 RX ORDER — MORPHINE SULFATE 50 MG/1
2 CAPSULE, EXTENDED RELEASE ORAL ONCE
Refills: 0 | Status: DISCONTINUED | OUTPATIENT
Start: 2020-11-06 | End: 2020-11-06

## 2020-11-06 RX ORDER — SODIUM CHLORIDE 9 MG/ML
500 INJECTION INTRAMUSCULAR; INTRAVENOUS; SUBCUTANEOUS
Refills: 0 | Status: COMPLETED | OUTPATIENT
Start: 2020-11-06 | End: 2020-11-06

## 2020-11-06 RX ORDER — OXYCODONE HYDROCHLORIDE 5 MG/1
10 TABLET ORAL EVERY 4 HOURS
Refills: 0 | Status: DISCONTINUED | OUTPATIENT
Start: 2020-11-06 | End: 2020-11-10

## 2020-11-06 RX ORDER — IRBESARTAN 75 MG/1
150 TABLET ORAL DAILY
Refills: 0 | Status: DISCONTINUED | OUTPATIENT
Start: 2020-11-06 | End: 2020-11-10

## 2020-11-06 RX ORDER — CELECOXIB 200 MG/1
200 CAPSULE ORAL
Refills: 0 | Status: DISCONTINUED | OUTPATIENT
Start: 2020-11-06 | End: 2020-11-10

## 2020-11-06 RX ORDER — SODIUM CHLORIDE 9 MG/ML
500 INJECTION INTRAMUSCULAR; INTRAVENOUS; SUBCUTANEOUS ONCE
Refills: 0 | Status: COMPLETED | OUTPATIENT
Start: 2020-11-06 | End: 2020-11-06

## 2020-11-06 RX ORDER — MORPHINE SULFATE 50 MG/1
2 CAPSULE, EXTENDED RELEASE ORAL EVERY 4 HOURS
Refills: 0 | Status: DISCONTINUED | OUTPATIENT
Start: 2020-11-06 | End: 2020-11-10

## 2020-11-06 RX ORDER — OXYCODONE HYDROCHLORIDE 5 MG/1
5 TABLET ORAL EVERY 4 HOURS
Refills: 0 | Status: DISCONTINUED | OUTPATIENT
Start: 2020-11-06 | End: 2020-11-10

## 2020-11-06 RX ADMIN — MORPHINE SULFATE 2 MILLIGRAM(S): 50 CAPSULE, EXTENDED RELEASE ORAL at 12:40

## 2020-11-06 RX ADMIN — MORPHINE SULFATE 2 MILLIGRAM(S): 50 CAPSULE, EXTENDED RELEASE ORAL at 17:44

## 2020-11-06 RX ADMIN — MORPHINE SULFATE 2 MILLIGRAM(S): 50 CAPSULE, EXTENDED RELEASE ORAL at 07:15

## 2020-11-06 RX ADMIN — OXYCODONE HYDROCHLORIDE 5 MILLIGRAM(S): 5 TABLET ORAL at 03:50

## 2020-11-06 RX ADMIN — Medication 1 TABLET(S): at 06:53

## 2020-11-06 RX ADMIN — MORPHINE SULFATE 2 MILLIGRAM(S): 50 CAPSULE, EXTENDED RELEASE ORAL at 06:53

## 2020-11-06 RX ADMIN — Medication 250 MILLIGRAM(S): at 17:43

## 2020-11-06 RX ADMIN — OXYCODONE HYDROCHLORIDE 5 MILLIGRAM(S): 5 TABLET ORAL at 03:21

## 2020-11-06 RX ADMIN — Medication 250 MILLIGRAM(S): at 06:54

## 2020-11-06 RX ADMIN — IRBESARTAN 150 MILLIGRAM(S): 75 TABLET ORAL at 07:31

## 2020-11-06 RX ADMIN — CELECOXIB 200 MILLIGRAM(S): 200 CAPSULE ORAL at 17:43

## 2020-11-06 RX ADMIN — Medication 100 MILLIGRAM(S): at 03:23

## 2020-11-06 RX ADMIN — POLYETHYLENE GLYCOL 3350 17 GRAM(S): 17 POWDER, FOR SOLUTION ORAL at 12:41

## 2020-11-06 RX ADMIN — SODIUM CHLORIDE 500 MILLILITER(S): 9 INJECTION INTRAMUSCULAR; INTRAVENOUS; SUBCUTANEOUS at 15:42

## 2020-11-06 RX ADMIN — SODIUM CHLORIDE 100 MILLILITER(S): 9 INJECTION INTRAMUSCULAR; INTRAVENOUS; SUBCUTANEOUS at 16:43

## 2020-11-06 RX ADMIN — PANTOPRAZOLE SODIUM 40 MILLIGRAM(S): 20 TABLET, DELAYED RELEASE ORAL at 06:54

## 2020-11-06 RX ADMIN — MORPHINE SULFATE 2 MILLIGRAM(S): 50 CAPSULE, EXTENDED RELEASE ORAL at 12:55

## 2020-11-06 RX ADMIN — Medication 1 ENEMA: at 18:33

## 2020-11-06 RX ADMIN — Medication 81 MILLIGRAM(S): at 06:54

## 2020-11-06 RX ADMIN — Medication 12.5 MILLIGRAM(S): at 06:54

## 2020-11-06 RX ADMIN — AMLODIPINE BESYLATE 2.5 MILLIGRAM(S): 2.5 TABLET ORAL at 06:54

## 2020-11-06 RX ADMIN — Medication 1 TABLET(S): at 14:52

## 2020-11-06 RX ADMIN — MORPHINE SULFATE 2 MILLIGRAM(S): 50 CAPSULE, EXTENDED RELEASE ORAL at 17:59

## 2020-11-06 RX ADMIN — Medication 81 MILLIGRAM(S): at 17:43

## 2020-11-06 RX ADMIN — CELECOXIB 200 MILLIGRAM(S): 200 CAPSULE ORAL at 18:43

## 2020-11-06 RX ADMIN — SENNA PLUS 2 TABLET(S): 8.6 TABLET ORAL at 03:21

## 2020-11-06 NOTE — PROGRESS NOTE ADULT - SUBJECTIVE AND OBJECTIVE BOX
Orthopedics Progress Note    Pt. stable, laying in bed comfortably in bed. Denies CP, SOB, N/V, numbness/tingling in b/l les.     Vital Signs Last 24 Hrs  T(C): 36.9 (06 Nov 2020 06:51), Max: 36.9 (06 Nov 2020 00:50)  T(F): 98.4 (06 Nov 2020 06:51), Max: 98.4 (06 Nov 2020 00:50)  HR: 98 (06 Nov 2020 06:51) (76 - 98)  BP: 164/72 (06 Nov 2020 06:51) (138/74 - 176/73)  BP(mean): 103 (05 Nov 2020 15:13) (94 - 105)  RR: 17 (06 Nov 2020 06:51) (12 - 19)  SpO2: 96% (06 Nov 2020 06:51) (87% - 100%)      Dressing C/D/I    Pulses: DP/PT 2+ B/L LES  SLT: intact B/L LES  Motor:  EHL/FHL/TA/GS 5/5 b/l les                      A/P: 89yoFemale POD#1 s/p left thr   - Stable  - Pain Control  - DVT ppx: asa  - Post op abx: ancef  - PT, WBS: wbat b/l les  - F/U AM Labs  - dispo: pending

## 2020-11-06 NOTE — PHYSICAL THERAPY INITIAL EVALUATION ADULT - PERTINENT HX OF CURRENT PROBLEM, REHAB EVAL
89yo woman with a PMH of HTN, osteoporosis and anxiety presenting after transfer from [facility] with L hip pain and inability to bear weight for approx 1 week after feeling a sharp pain in the hip while opening her shower door. Denies any fall or trauma. Pt had L IT fracture treated with IMN, now s/p removal of hardware, core decompression, abductor repair and bone graft

## 2020-11-06 NOTE — PROGRESS NOTE ADULT - SUBJECTIVE AND OBJECTIVE BOX
OVERNIGHT EVENTS:    SUBJECTIVE / INTERVAL HPI: Patient seen and examined at bedside. Seen at bedside w/ ortho NP.     patient w/ multiple complaints including pain and constipation. Offered enema but refuses to move for it due to pain. When told pain regimen will be changed she is not happy.     VITAL SIGNS:  Vital Signs Last 24 Hrs  T(C): 36.9 (06 Nov 2020 12:38), Max: 36.9 (06 Nov 2020 00:50)  T(F): 98.4 (06 Nov 2020 12:38), Max: 98.4 (06 Nov 2020 00:50)  HR: 84 (06 Nov 2020 12:38) (79 - 98)  BP: 129/56 (06 Nov 2020 12:38) (129/56 - 164/72)  BP(mean): --  RR: 18 (06 Nov 2020 12:38) (14 - 18)  SpO2: 100% (06 Nov 2020 12:38) (94% - 100%)    PHYSICAL EXAM:    General: WDWN, appears younger than stated age  HEENT: NC/AT;  Neck: supple  Cardiovascular: +S1/S2; RRR  Respiratory: CTA b/l  Gastrointestinal: RUQ nontender, soft, NT/ND; +BSx4  Extremities: WWP; L hip dressing c/d/i  MEDICATIONS:  MEDICATIONS  (STANDING):  amLODIPine   Tablet 2.5 milliGRAM(s) Oral <User Schedule>  aspirin enteric coated 81 milliGRAM(s) Oral two times a day  calcium carbonate 1250 mG  + Vitamin D (OsCal 500 + D) 1 Tablet(s) Oral three times a day  celecoxib 200 milliGRAM(s) Oral two times a day  irbesartan 150 milliGRAM(s) Oral daily  lactated ringers. 1000 milliLiter(s) (125 mL/Hr) IV Continuous <Continuous>  losartan 50 milliGRAM(s) Oral daily  pantoprazole    Tablet 40 milliGRAM(s) Oral before breakfast  polyethylene glycol 3350 17 Gram(s) Oral daily  povidone iodine 10% Solution 1 Application(s) Topical once  povidone iodine 5% Nasal Swab 1 Application(s) Both Nostrils once  saccharomyces boulardii 250 milliGRAM(s) Oral two times a day  saline laxative (FLEET) Rectal Enema 1 Enema Rectal once  sodium chloride 0.9%. 500 milliLiter(s) (100 mL/Hr) IV Continuous <Continuous>    MEDICATIONS  (PRN):  aluminum hydroxide/magnesium hydroxide/simethicone Suspension 30 milliLiter(s) Oral four times a day PRN Indigestion  artificial tears (preservative free) Ophthalmic Solution 1 Drop(s) Both EYES three times a day PRN Dry Eyes  diphenhydrAMINE 50 milliGRAM(s) Oral every 6 hours PRN Rash and/or Itching  magnesium hydroxide Suspension 30 milliLiter(s) Oral daily PRN Constipation  metoclopramide Injectable 10 milliGRAM(s) IV Push every 6 hours PRN Nausea and/or Vomiting, second line  morphine  - Injectable 2 milliGRAM(s) IV Push every 4 hours PRN breakthrough pain  ondansetron Injectable 4 milliGRAM(s) IV Push every 6 hours PRN Nausea and/or Vomiting  oxyCODONE    IR 5 milliGRAM(s) Oral every 4 hours PRN Moderate Pain (4 - 6)  oxyCODONE    IR 10 milliGRAM(s) Oral every 4 hours PRN Severe Pain (7 - 10)  senna 2 Tablet(s) Oral at bedtime PRN Constipation      ALLERGIES:  Allergies    carvedilol (Unknown)  codeine (Unknown)  conjugated estrogens (Unknown)  doxycycline (Unknown)  Levaquin (Unknown)  lubiprostone (Unknown)  nitrofurantoin (Unknown)  nortriptyline (Unknown)  penicillin (Unknown)  Tylenol (Rash)    Intolerances        LABS:                        10.3   12.99 )-----------( 360      ( 06 Nov 2020 10:40 )             31.0     11-06    131<L>  |  90<L>  |  10  ----------------------------<  115<H>  3.8   |  30  |  0.50    Ca    9.2      06 Nov 2020 10:40    TPro  5.4<L>  /  Alb  3.1<L>  /  TBili  0.2  /  DBili  <0.2  /  AST  153<H>  /  ALT  254<H>  /  AlkPhos  214<H>  11-05        CAPILLARY BLOOD GLUCOSE          RADIOLOGY & ADDITIONAL TESTS: Reviewed.    ASSESSMENT:    PLAN:

## 2020-11-06 NOTE — PHYSICAL THERAPY INITIAL EVALUATION ADULT - IMPAIRMENTS CONTRIBUTING IMPAIRED BED MOBILITY, REHAB EVAL
< from: 12 Lead ECG (08.13.18 @ 13:04) >      Ventricular Rate 55 BPM    Atrial Rate 55 BPM    P-R Interval 142 ms    QRS Duration 76 ms    Q-T Interval 456 ms    QTC Calculation(Bezet) 436 ms    P Axis 43 degrees    R Axis 15 degrees    T Axis 41 degrees    Diagnosis Line SINUS BRADYCARDIA  OTHERWISE NORMAL ECG    Confirmed by NELSON GILLIAM MD ([id number]) on 8/14/2018 7:26:07 PM    < end of copied text > pain/decreased strength/impaired balance

## 2020-11-06 NOTE — PROGRESS NOTE ADULT - SUBJECTIVE AND OBJECTIVE BOX
Ortho Note    Pt seen and examined. Reports pain as moderately controlled in bed, and severe with movement.  Denies CP, SOB, N/V, numbness/tingling     Vital Signs Last 24 Hrs  T(C): 36.7 (11-06-20 @ 09:48), Max: 36.9 (11-06-20 @ 06:51)  T(F): 98 (11-06-20 @ 09:48), Max: 98.4 (11-06-20 @ 06:51)  HR: 90 (11-06-20 @ 09:48) (90 - 98)  BP: 155/69 (11-06-20 @ 09:48) (155/69 - 164/72)  BP(mean): --  RR: 18 (11-06-20 @ 09:48) (17 - 18)  SpO2: 100% (11-06-20 @ 09:48) (96% - 100%)  AVSS    focused exam:  General: Pt Alert and oriented, NAD  DSG C/D/I  Pulses: +2DP, WWP feet  Sensation: SILT BLE  Motor: 5/5 EHL/FHL/TA/GS                          10.3   12.99 )-----------( 360      ( 06 Nov 2020 10:40 )             31.0   06 Nov 2020 10:40    131    |  90     |  10     ----------------------------<  115    3.8     |  30     |  0.50     Ca    9.2        06 Nov 2020 10:40    TPro  5.4    /  Alb  3.1    /  TBili  0.2    /  DBili  <0.2   /  AST  153    /  ALT  254    /  AlkPhos  214    05 Nov 2020 06:08      A/P: 89yFemale POD#1 s/p left SHAYAN s/p left FNF  - Stable  - Pain Control- c/o "itchiness" with toradol, aleve and tramadol. Does not report with oxy5. Increased to 5-10mg for now.  - DVT ppx: ASA  - PT, WBS: WBAT  - constipation- no N/V, +BS; has h/o ileus after surgery- will increase laxatives and obtain abd x-ray; low threshold for GI consult  - OOB for meals as tolerated, I/S  - dispo: pending PT eval      Ortho Pager [phone]

## 2020-11-06 NOTE — PROGRESS NOTE ADULT - ASSESSMENT
89 year old F w/ PMH of HTN, osteoporosis p/w L femoral neck fracture s/p L IM nail, course c/b transminitis of unclear etiology, now downtrending. Post-op c/o pain and constipation.     #Post-op care: c/w pain control, avoid opioids, c/w aggressive bowel regimen (miralax, lactulose, enemas), c/w IS  #Transaminitis: downtrending, unclear etiology, LFTs added on for today, no signs of liver dysfunction, no obstructive pathology, possibly related to tylenol use?, continue to trend  #Anemia: acute blood loss 2/2 surgery, trend H/H, goal >7  #Leukocytosis: likely reactive to surgery, no s/s of infection  #Hyponatremia: asymptomatic, pre-renal vs SIADH, discontinued HCTZ which may cause hyponatremia, given trial of IVF, recheck BMP tomorrow, if continues to go down would get urine lytes and this would support diagnosis of SIADH, if SIADH can try fluid restriction vs salt tabs (probably preferable)  #DVT ppx: c/w ASA BID  #HTN: c/w irbesartan, norvasc (she was also on losartan for unclear reasons, no indication for two ARBs, I discontinued the losartan)  #Dispo: pending RONNY

## 2020-11-06 NOTE — PHYSICAL THERAPY INITIAL EVALUATION ADULT - GENERAL OBSERVATIONS, REHAB EVAL
Patient received semi-davis in bed  in NAD on 3L supplemental O2 via. NC, +SCDs, +IV. Cleared by CAR Weeks. Agreeable to PT.

## 2020-11-07 LAB
ALBUMIN SERPL ELPH-MCNC: 2.9 G/DL — LOW (ref 3.3–5)
ALP SERPL-CCNC: 281 U/L — HIGH (ref 40–120)
ALT FLD-CCNC: 221 U/L — HIGH (ref 10–45)
ANION GAP SERPL CALC-SCNC: 12 MMOL/L — SIGNIFICANT CHANGE UP (ref 5–17)
AST SERPL-CCNC: 160 U/L — HIGH (ref 10–40)
BILIRUB SERPL-MCNC: 0.5 MG/DL — SIGNIFICANT CHANGE UP (ref 0.2–1.2)
BUN SERPL-MCNC: 10 MG/DL — SIGNIFICANT CHANGE UP (ref 7–23)
CALCIUM SERPL-MCNC: 9 MG/DL — SIGNIFICANT CHANGE UP (ref 8.4–10.5)
CHLORIDE SERPL-SCNC: 90 MMOL/L — LOW (ref 96–108)
CO2 SERPL-SCNC: 31 MMOL/L — SIGNIFICANT CHANGE UP (ref 22–31)
CREAT SERPL-MCNC: 0.52 MG/DL — SIGNIFICANT CHANGE UP (ref 0.5–1.3)
GLUCOSE SERPL-MCNC: 122 MG/DL — HIGH (ref 70–99)
HCT VFR BLD CALC: 27.9 % — LOW (ref 34.5–45)
HGB BLD-MCNC: 9.2 G/DL — LOW (ref 11.5–15.5)
MCHC RBC-ENTMCNC: 28 PG — SIGNIFICANT CHANGE UP (ref 27–34)
MCHC RBC-ENTMCNC: 33 GM/DL — SIGNIFICANT CHANGE UP (ref 32–36)
MCV RBC AUTO: 84.8 FL — SIGNIFICANT CHANGE UP (ref 80–100)
NRBC # BLD: 0 /100 WBCS — SIGNIFICANT CHANGE UP (ref 0–0)
PLATELET # BLD AUTO: 361 K/UL — SIGNIFICANT CHANGE UP (ref 150–400)
POTASSIUM SERPL-MCNC: 3.3 MMOL/L — LOW (ref 3.5–5.3)
POTASSIUM SERPL-SCNC: 3.3 MMOL/L — LOW (ref 3.5–5.3)
PROT SERPL-MCNC: 5.5 G/DL — LOW (ref 6–8.3)
RBC # BLD: 3.29 M/UL — LOW (ref 3.8–5.2)
RBC # FLD: 13.1 % — SIGNIFICANT CHANGE UP (ref 10.3–14.5)
SODIUM SERPL-SCNC: 133 MMOL/L — LOW (ref 135–145)
WBC # BLD: 11.73 K/UL — HIGH (ref 3.8–10.5)
WBC # FLD AUTO: 11.73 K/UL — HIGH (ref 3.8–10.5)

## 2020-11-07 PROCEDURE — 99233 SBSQ HOSP IP/OBS HIGH 50: CPT | Mod: GC

## 2020-11-07 PROCEDURE — 99222 1ST HOSP IP/OBS MODERATE 55: CPT

## 2020-11-07 PROCEDURE — 93975 VASCULAR STUDY: CPT | Mod: 26

## 2020-11-07 PROCEDURE — 76705 ECHO EXAM OF ABDOMEN: CPT | Mod: 26,59

## 2020-11-07 RX ORDER — POTASSIUM CHLORIDE 20 MEQ
40 PACKET (EA) ORAL EVERY 4 HOURS
Refills: 0 | Status: COMPLETED | OUTPATIENT
Start: 2020-11-07 | End: 2020-11-07

## 2020-11-07 RX ORDER — POLYETHYLENE GLYCOL 3350 17 G/17G
17 POWDER, FOR SOLUTION ORAL
Refills: 0 | Status: DISCONTINUED | OUTPATIENT
Start: 2020-11-07 | End: 2020-11-10

## 2020-11-07 RX ADMIN — CELECOXIB 200 MILLIGRAM(S): 200 CAPSULE ORAL at 17:39

## 2020-11-07 RX ADMIN — AMLODIPINE BESYLATE 2.5 MILLIGRAM(S): 2.5 TABLET ORAL at 07:16

## 2020-11-07 RX ADMIN — Medication 250 MILLIGRAM(S): at 07:38

## 2020-11-07 RX ADMIN — Medication 81 MILLIGRAM(S): at 07:16

## 2020-11-07 RX ADMIN — SENNA PLUS 2 TABLET(S): 8.6 TABLET ORAL at 00:02

## 2020-11-07 RX ADMIN — CELECOXIB 200 MILLIGRAM(S): 200 CAPSULE ORAL at 07:16

## 2020-11-07 RX ADMIN — IRBESARTAN 150 MILLIGRAM(S): 75 TABLET ORAL at 07:15

## 2020-11-07 RX ADMIN — Medication 1 ENEMA: at 14:52

## 2020-11-07 RX ADMIN — MORPHINE SULFATE 2 MILLIGRAM(S): 50 CAPSULE, EXTENDED RELEASE ORAL at 18:15

## 2020-11-07 RX ADMIN — Medication 81 MILLIGRAM(S): at 17:39

## 2020-11-07 RX ADMIN — AMLODIPINE BESYLATE 2.5 MILLIGRAM(S): 2.5 TABLET ORAL at 17:39

## 2020-11-07 RX ADMIN — MORPHINE SULFATE 2 MILLIGRAM(S): 50 CAPSULE, EXTENDED RELEASE ORAL at 18:32

## 2020-11-07 RX ADMIN — Medication 250 MILLIGRAM(S): at 17:39

## 2020-11-07 RX ADMIN — CELECOXIB 200 MILLIGRAM(S): 200 CAPSULE ORAL at 08:16

## 2020-11-07 RX ADMIN — CELECOXIB 200 MILLIGRAM(S): 200 CAPSULE ORAL at 18:39

## 2020-11-07 NOTE — PROGRESS NOTE ADULT - SUBJECTIVE AND OBJECTIVE BOX
pt had BM today   no nausea or vomiting  ros otherwise negative     VITAL SIGNS:  Vital Signs Last 24 Hrs  T(C): 36.9 (06 Nov 2020 12:38), Max: 36.9 (06 Nov 2020 00:50)  T(F): 98.4 (06 Nov 2020 12:38), Max: 98.4 (06 Nov 2020 00:50)  HR: 84 (06 Nov 2020 12:38) (79 - 98)  BP: 129/56 (06 Nov 2020 12:38) (129/56 - 164/72)  BP(mean): --  RR: 18 (06 Nov 2020 12:38) (14 - 18)  SpO2: 100% (06 Nov 2020 12:38) (94% - 100%)    PHYSICAL EXAM:    General: WDWN, appears younger than stated age  HEENT: NC/AT;  Neck: supple  Cardiovascular: +S1/S2; RRR  Respiratory: CTA b/l  Gastrointestinal: RUQ nontender, soft, NT/ND; +BSx4  Extremities: WWP; L hip dressing c/d/i  MEDICATIONS:  MEDICATIONS  (STANDING):  amLODIPine   Tablet 2.5 milliGRAM(s) Oral <User Schedule>  aspirin enteric coated 81 milliGRAM(s) Oral two times a day  calcium carbonate 1250 mG  + Vitamin D (OsCal 500 + D) 1 Tablet(s) Oral three times a day  celecoxib 200 milliGRAM(s) Oral two times a day  irbesartan 150 milliGRAM(s) Oral daily  lactated ringers. 1000 milliLiter(s) (125 mL/Hr) IV Continuous <Continuous>  losartan 50 milliGRAM(s) Oral daily  pantoprazole    Tablet 40 milliGRAM(s) Oral before breakfast  polyethylene glycol 3350 17 Gram(s) Oral daily  povidone iodine 10% Solution 1 Application(s) Topical once  povidone iodine 5% Nasal Swab 1 Application(s) Both Nostrils once  saccharomyces boulardii 250 milliGRAM(s) Oral two times a day  saline laxative (FLEET) Rectal Enema 1 Enema Rectal once  sodium chloride 0.9%. 500 milliLiter(s) (100 mL/Hr) IV Continuous <Continuous>    MEDICATIONS  (PRN):  aluminum hydroxide/magnesium hydroxide/simethicone Suspension 30 milliLiter(s) Oral four times a day PRN Indigestion  artificial tears (preservative free) Ophthalmic Solution 1 Drop(s) Both EYES three times a day PRN Dry Eyes  diphenhydrAMINE 50 milliGRAM(s) Oral every 6 hours PRN Rash and/or Itching  magnesium hydroxide Suspension 30 milliLiter(s) Oral daily PRN Constipation  metoclopramide Injectable 10 milliGRAM(s) IV Push every 6 hours PRN Nausea and/or Vomiting, second line  morphine  - Injectable 2 milliGRAM(s) IV Push every 4 hours PRN breakthrough pain  ondansetron Injectable 4 milliGRAM(s) IV Push every 6 hours PRN Nausea and/or Vomiting  oxyCODONE    IR 5 milliGRAM(s) Oral every 4 hours PRN Moderate Pain (4 - 6)  oxyCODONE    IR 10 milliGRAM(s) Oral every 4 hours PRN Severe Pain (7 - 10)  senna 2 Tablet(s) Oral at bedtime PRN Constipation      ALLERGIES:  Allergies    carvedilol (Unknown)  codeine (Unknown)  conjugated estrogens (Unknown)  doxycycline (Unknown)  Levaquin (Unknown)  lubiprostone (Unknown)  nitrofurantoin (Unknown)  nortriptyline (Unknown)  penicillin (Unknown)  Tylenol (Rash)    Intolerances        LABS:                        10.3   12.99 )-----------( 360      ( 06 Nov 2020 10:40 )             31.0     11-06    131<L>  |  90<L>  |  10  ----------------------------<  115<H>  3.8   |  30  |  0.50    Ca    9.2      06 Nov 2020 10:40    TPro  5.4<L>  /  Alb  3.1<L>  /  TBili  0.2  /  DBili  <0.2  /  AST  153<H>  /  ALT  254<H>  /  AlkPhos  214<H>  11-05        CAPILLARY BLOOD GLUCOSE          RADIOLOGY & ADDITIONAL TESTS: Reviewed.    ASSESSMENT:    PLAN:

## 2020-11-07 NOTE — PROGRESS NOTE ADULT - SUBJECTIVE AND OBJECTIVE BOX
Orthopedics Progress Note    Pt. stable, laying in bed comfortably in bed. Denies CP, SOB, N/V, numbness/tingling in b/l les. Still constipated    Vital Signs Last 24 Hrs  T(C): 36.7 (07 Nov 2020 07:03), Max: 36.9 (06 Nov 2020 12:38)  T(F): 98 (07 Nov 2020 07:03), Max: 98.5 (06 Nov 2020 21:51)  HR: 92 (07 Nov 2020 07:03) (74 - 95)  BP: 132/60 (07 Nov 2020 07:03) (115/54 - 155/69)  BP(mean): --  RR: 17 (07 Nov 2020 07:03) (16 - 18)  SpO2: 95% (07 Nov 2020 07:03) (93% - 100%)    Dressing C/D/I    Pulses: DP/PT 2+ B/L LES  SLT: intact B/L LES  Motor:  EHL/FHL/TA/GS 5/5 b/l les                      A/P: 89yoFemale s/p left thr on 11/5   - Stable  - Pain Control  - DVT ppx: asa  - Post op abx: ancef  - PT, WBS: wbat b/l les  - F/U AM Labs  - F/u abd U/S and GI consult  - dispo: pending

## 2020-11-07 NOTE — CONSULT NOTE ADULT - ASSESSMENT
89 year old F w/ PMH of HTN, osteoporosis p/w L femoral neck fracture s/p L IM nail, course c/b transminitis of unclear etiology, now downtrending. Post-op c/o pain and constipation. 89 year old F w/ PMH of HTN, osteoporosis p/w L femoral neck fracture s/p L IM nail. GI is consulted for transaminitis and constipation    # Transaminitis  had elevation in ALP, AST and ALT on admission  Then fluctuated during the hospital course and now downtrending again  Normal INR and T.bili throughout  Hep screen negative  US doppler: Mildly prominent common bile duct, probably related to age and postcholecystectomy state.  Most likely etiology is DILI. Given the LT are improving spontaneously, would continue to monitor and avoid extensive further work up at this time  Avoid hepatotoxins    # Chronic idiopathic constipation  Had BM after fleet enema today  - Miralax daily  - fluid intake ~64oz/day  - High fiber diet or benefiber 1tbsp BID  - avoid opiates      Recommendations discussed with primary team  Plan discussed with GI service attending    Deon Huitron MD  PGY-4 GI fellow  Pager: [phone]

## 2020-11-07 NOTE — CONSULT NOTE ADULT - SUBJECTIVE AND OBJECTIVE BOX
GASTROENTEROLOGY CONSULT NOTE  HPI:  89yo woman with a PMH of HTN, osteoporosis and anxiety presenting after transfer from [facility] with L hip pain and inability to bear weight for approx 1 week after feeling a sharp pain in the hip while opening her shower door. Denies any fall or trauma. Pt had L IT fracture treated with IMN, now s/p removal of hardware, core decompression, abductor repair and bone graft with cells with Dr. Rodríguez on Sept 3 2020. Pt states she had been ambulating fine until the recent incident. Denies any numbness/tingling in LLE, denies pain or injury elsewhere. (04 Nov 2020 01:34)    Allergies    carvedilol (Unknown)  codeine (Unknown)  conjugated estrogens (Unknown)  doxycycline (Unknown)  Levaquin (Unknown)  lubiprostone (Unknown)  nitrofurantoin (Unknown)  nortriptyline (Unknown)  penicillin (Unknown)  Tylenol (Rash)    Intolerances      Home Medications:  amLODIPine 2.5 mg oral tablet: 1 tab(s) orally twice daily (05 Nov 2020 10:11)  irbesartan 150 mg oral tablet: 1 tab(s) orally once a day (02 Sep 2020 15:26)    MEDICATIONS:  MEDICATIONS  (STANDING):  amLODIPine   Tablet 2.5 milliGRAM(s) Oral <User Schedule>  aspirin enteric coated 81 milliGRAM(s) Oral two times a day  calcium carbonate 1250 mG  + Vitamin D (OsCal 500 + D) 1 Tablet(s) Oral three times a day  celecoxib 200 milliGRAM(s) Oral two times a day  irbesartan 150 milliGRAM(s) Oral daily  lactated ringers. 1000 milliLiter(s) (125 mL/Hr) IV Continuous <Continuous>  pantoprazole    Tablet 40 milliGRAM(s) Oral before breakfast  polyethylene glycol 3350 17 Gram(s) Oral two times a day  potassium chloride    Tablet ER 40 milliEquivalent(s) Oral every 4 hours  povidone iodine 10% Solution 1 Application(s) Topical once  povidone iodine 5% Nasal Swab 1 Application(s) Both Nostrils once  saccharomyces boulardii 250 milliGRAM(s) Oral two times a day    MEDICATIONS  (PRN):  aluminum hydroxide/magnesium hydroxide/simethicone Suspension 30 milliLiter(s) Oral four times a day PRN Indigestion  artificial tears (preservative free) Ophthalmic Solution 1 Drop(s) Both EYES three times a day PRN Dry Eyes  bisacodyl Suppository 10 milliGRAM(s) Rectal daily PRN If no bowel movement by POD#2  diphenhydrAMINE 50 milliGRAM(s) Oral every 6 hours PRN Rash and/or Itching  magnesium hydroxide Suspension 30 milliLiter(s) Oral daily PRN Constipation  metoclopramide Injectable 10 milliGRAM(s) IV Push every 6 hours PRN Nausea and/or Vomiting, second line  morphine  - Injectable 2 milliGRAM(s) IV Push every 4 hours PRN breakthrough pain  ondansetron Injectable 4 milliGRAM(s) IV Push every 6 hours PRN Nausea and/or Vomiting  oxyCODONE    IR 5 milliGRAM(s) Oral every 4 hours PRN Moderate Pain (4 - 6)  oxyCODONE    IR 10 milliGRAM(s) Oral every 4 hours PRN Severe Pain (7 - 10)  senna 2 Tablet(s) Oral at bedtime PRN Constipation    PAST MEDICAL & SURGICAL HISTORY:  Anxiety    Osteoporosis    HTN (hypertension)    H/O eye surgery    History of hip surgery    History of appendectomy      FAMILY HISTORY:    SOCIAL HISTORY:  [place]: [ ] Current, [ ] Former, [ ] Never; Pack Years:  Alcohol:  Illicit Drugs:    REVIEW OF SYSTEMS:  CONSTITUTIONAL: No weakness, fevers or chills  HEENT: No visual changes; No vertigo or throat pain   NECK: No pain or stiffness  RESPIRATORY: No cough, wheezing, hemoptysis; No shortness of breath  CARDIOVASCULAR: No chest pain or palpitations  GASTROINTESTINAL: constipation+, no pain  GENITOURINARY: No dysuria, frequency or hematuria  NEUROLOGICAL: No numbness or weakness  SKIN: No itching, burning, rashes, or lesions   All other 10 review of systems is negative unless indicated above.    Vital Signs Last 24 Hrs  T(C): 36.7 (07 Nov 2020 09:07), Max: 36.9 (06 Nov 2020 21:51)  T(F): 98 (07 Nov 2020 09:07), Max: 98.5 (06 Nov 2020 21:51)  HR: 90 (07 Nov 2020 09:07) (90 - 95)  BP: 126/79 (07 Nov 2020 09:07) (126/79 - 136/63)  BP(mean): --  RR: 15 (07 Nov 2020 09:07) (15 - 17)  SpO2: 95% (07 Nov 2020 09:07) (93% - 95%)    11-06 @ 07:01  -  11-07 @ 07:00  --------------------------------------------------------  IN: 1675 mL / OUT: 1100 mL / NET: 575 mL        PHYSICAL EXAM:    General: Well developed; well nourished; in no acute distress  Eyes: Anicteric sclerae, moist conjunctivae  HENT: Moist mucous membranes  Neck: Trachea midline, supple  Lungs: Normal respiratory effort, no intercostal retractions  Cardiovascular: RRR  Abdomen: Soft, non-tender non-distended;  No rebound or guarding  Extremities: Normal range of motion, No clubbing, cyanosis or edema  Neurological: Alert and oriented x3  Skin: Warm and dry. No obvious rash    LABS:                        9.2    11.73 )-----------( 361      ( 07 Nov 2020 15:38 )             27.9     11-07    133<L>  |  90<L>  |  10  ----------------------------<  122<H>  3.3<L>   |  31  |  0.52    Ca    9.0      07 Nov 2020 15:38    TPro  5.5<L>  /  Alb  2.9<L>  /  TBili  0.5  /  DBili  x   /  AST  160<H>  /  ALT  221<H>  /  AlkPhos  281<H>  11-07            RADIOLOGY & ADDITIONAL STUDIES:     Reviewed GASTROENTEROLOGY CONSULT NOTE  HPI:  89yo woman with a PMH of HTN, osteoporosis and anxiety presenting after transfer from [facility] with L hip pain and inability to bear weight for approx 1 week after feeling a sharp pain in the hip while opening her shower door. Denies any fall or trauma. Pt had L IT fracture treated with IMN, now s/p removal of hardware, core decompression, abductor repair and bone graft with cells with Dr. Rodríguez on Sept 3 2020. Pt states she had been ambulating fine until the recent incident. Denies any numbness/tingling in LLE, denies pain or injury elsewhere. (04 Nov 2020 01:34)    Allergies    carvedilol (Unknown)  codeine (Unknown)  conjugated estrogens (Unknown)  doxycycline (Unknown)  Levaquin (Unknown)  lubiprostone (Unknown)  nitrofurantoin (Unknown)  nortriptyline (Unknown)  penicillin (Unknown)  Tylenol (Rash)    Intolerances      Home Medications:  amLODIPine 2.5 mg oral tablet: 1 tab(s) orally twice daily (05 Nov 2020 10:11)  irbesartan 150 mg oral tablet: 1 tab(s) orally once a day (02 Sep 2020 15:26)    MEDICATIONS:  MEDICATIONS  (STANDING):  amLODIPine   Tablet 2.5 milliGRAM(s) Oral <User Schedule>  aspirin enteric coated 81 milliGRAM(s) Oral two times a day  calcium carbonate 1250 mG  + Vitamin D (OsCal 500 + D) 1 Tablet(s) Oral three times a day  celecoxib 200 milliGRAM(s) Oral two times a day  irbesartan 150 milliGRAM(s) Oral daily  lactated ringers. 1000 milliLiter(s) (125 mL/Hr) IV Continuous <Continuous>  pantoprazole    Tablet 40 milliGRAM(s) Oral before breakfast  polyethylene glycol 3350 17 Gram(s) Oral two times a day  potassium chloride    Tablet ER 40 milliEquivalent(s) Oral every 4 hours  povidone iodine 10% Solution 1 Application(s) Topical once  povidone iodine 5% Nasal Swab 1 Application(s) Both Nostrils once  saccharomyces boulardii 250 milliGRAM(s) Oral two times a day    MEDICATIONS  (PRN):  aluminum hydroxide/magnesium hydroxide/simethicone Suspension 30 milliLiter(s) Oral four times a day PRN Indigestion  artificial tears (preservative free) Ophthalmic Solution 1 Drop(s) Both EYES three times a day PRN Dry Eyes  bisacodyl Suppository 10 milliGRAM(s) Rectal daily PRN If no bowel movement by POD#2  diphenhydrAMINE 50 milliGRAM(s) Oral every 6 hours PRN Rash and/or Itching  magnesium hydroxide Suspension 30 milliLiter(s) Oral daily PRN Constipation  metoclopramide Injectable 10 milliGRAM(s) IV Push every 6 hours PRN Nausea and/or Vomiting, second line  morphine  - Injectable 2 milliGRAM(s) IV Push every 4 hours PRN breakthrough pain  ondansetron Injectable 4 milliGRAM(s) IV Push every 6 hours PRN Nausea and/or Vomiting  oxyCODONE    IR 5 milliGRAM(s) Oral every 4 hours PRN Moderate Pain (4 - 6)  oxyCODONE    IR 10 milliGRAM(s) Oral every 4 hours PRN Severe Pain (7 - 10)  senna 2 Tablet(s) Oral at bedtime PRN Constipation    PAST MEDICAL & SURGICAL HISTORY:  Anxiety    Osteoporosis    HTN (hypertension)    H/O eye surgery    History of hip surgery    History of appendectomy      FAMILY HISTORY:    SOCIAL HISTORY:  [place]: [ ] Current, [ ] Former, [ ] Never; Pack Years:  Alcohol:  Illicit Drugs:    REVIEW OF SYSTEMS:  CONSTITUTIONAL: No weakness, fevers or chills  HEENT: No visual changes; No vertigo or throat pain   NECK: No pain or stiffness  RESPIRATORY: No cough, wheezing, hemoptysis; No shortness of breath  CARDIOVASCULAR: No chest pain or palpitations  GASTROINTESTINAL: constipation+, no pain  GENITOURINARY: No dysuria, frequency or hematuria  NEUROLOGICAL: No numbness or weakness  SKIN: No itching, burning, rashes, or lesions   All other 10 review of systems is negative unless indicated above.    Vital Signs Last 24 Hrs  T(C): 36.7 (07 Nov 2020 09:07), Max: 36.9 (06 Nov 2020 21:51)  T(F): 98 (07 Nov 2020 09:07), Max: 98.5 (06 Nov 2020 21:51)  HR: 90 (07 Nov 2020 09:07) (90 - 95)  BP: 126/79 (07 Nov 2020 09:07) (126/79 - 136/63)  BP(mean): --  RR: 15 (07 Nov 2020 09:07) (15 - 17)  SpO2: 95% (07 Nov 2020 09:07) (93% - 95%)    11-06 @ 07:01  -  11-07 @ 07:00  --------------------------------------------------------  IN: 1675 mL / OUT: 1100 mL / NET: 575 mL        PHYSICAL EXAM:    General: Well developed; well nourished; in no acute distress  Eyes: Anicteric sclerae, moist conjunctivae  HENT: Moist mucous membranes  Neck: Trachea midline, supple  Lungs: Normal respiratory effort, no intercostal retractions  Cardiovascular: RRR  Abdomen: Soft, non-tender non-distended;  No rebound or guarding  Extremities: limited 2/2 post-op, No clubbing, cyanosis or edema  Neurological: Alert and oriented x3  Skin: Warm and dry. No obvious rash    LABS:                        9.2    11.73 )-----------( 361      ( 07 Nov 2020 15:38 )             27.9     11-07    133<L>  |  90<L>  |  10  ----------------------------<  122<H>  3.3<L>   |  31  |  0.52    Ca    9.0      07 Nov 2020 15:38    TPro  5.5<L>  /  Alb  2.9<L>  /  TBili  0.5  /  DBili  x   /  AST  160<H>  /  ALT  221<H>  /  AlkPhos  281<H>  11-07            RADIOLOGY & ADDITIONAL STUDIES:     Reviewed

## 2020-11-07 NOTE — PROGRESS NOTE ADULT - ATTENDING COMMENTS
89 year old F w/ PMH of HTN, osteoporosis p/w L femoral neck fracture s/p L IM nail, course c/b transminitis of unclear etiology, now downtrending. Post-op c/o pain and constipation.     1-Post-op care: c/w pain control, avoid opioids, c/w aggressive bowel regimen (miralax, lactulose, enemas), c/w IS  2- Transaminitis: downtrending, unclear etiology,  possibly related to medications/ tylenol use  RUQ sono- normal   3-Anemia: acute blood loss 2/2 surgery, trend H/H, goal >7  4- Leukocytosis: likely reactive to surgery, no s/s of infection  5- Hyponatremia: asymptomatic, stable possibly siadh

## 2020-11-08 LAB
ALBUMIN SERPL ELPH-MCNC: 2.5 G/DL — LOW (ref 3.3–5)
ALP SERPL-CCNC: 227 U/L — HIGH (ref 40–120)
ALT FLD-CCNC: 151 U/L — HIGH (ref 10–45)
ANION GAP SERPL CALC-SCNC: 10 MMOL/L — SIGNIFICANT CHANGE UP (ref 5–17)
AST SERPL-CCNC: 89 U/L — HIGH (ref 10–40)
BILIRUB SERPL-MCNC: 0.3 MG/DL — SIGNIFICANT CHANGE UP (ref 0.2–1.2)
BUN SERPL-MCNC: 8 MG/DL — SIGNIFICANT CHANGE UP (ref 7–23)
CALCIUM SERPL-MCNC: 8.8 MG/DL — SIGNIFICANT CHANGE UP (ref 8.4–10.5)
CHLORIDE SERPL-SCNC: 90 MMOL/L — LOW (ref 96–108)
CO2 SERPL-SCNC: 32 MMOL/L — HIGH (ref 22–31)
CREAT SERPL-MCNC: 0.59 MG/DL — SIGNIFICANT CHANGE UP (ref 0.5–1.3)
GLUCOSE SERPL-MCNC: 132 MG/DL — HIGH (ref 70–99)
HCT VFR BLD CALC: 26.2 % — LOW (ref 34.5–45)
HGB BLD-MCNC: 8.6 G/DL — LOW (ref 11.5–15.5)
MCHC RBC-ENTMCNC: 28.1 PG — SIGNIFICANT CHANGE UP (ref 27–34)
MCHC RBC-ENTMCNC: 32.8 GM/DL — SIGNIFICANT CHANGE UP (ref 32–36)
MCV RBC AUTO: 85.6 FL — SIGNIFICANT CHANGE UP (ref 80–100)
NRBC # BLD: 0 /100 WBCS — SIGNIFICANT CHANGE UP (ref 0–0)
PLATELET # BLD AUTO: 320 K/UL — SIGNIFICANT CHANGE UP (ref 150–400)
POTASSIUM SERPL-MCNC: 3.1 MMOL/L — LOW (ref 3.5–5.3)
POTASSIUM SERPL-SCNC: 3.1 MMOL/L — LOW (ref 3.5–5.3)
PROT SERPL-MCNC: 5.1 G/DL — LOW (ref 6–8.3)
RBC # BLD: 3.06 M/UL — LOW (ref 3.8–5.2)
RBC # FLD: 13 % — SIGNIFICANT CHANGE UP (ref 10.3–14.5)
SODIUM SERPL-SCNC: 132 MMOL/L — LOW (ref 135–145)
WBC # BLD: 10.67 K/UL — HIGH (ref 3.8–10.5)
WBC # FLD AUTO: 10.67 K/UL — HIGH (ref 3.8–10.5)

## 2020-11-08 PROCEDURE — 99233 SBSQ HOSP IP/OBS HIGH 50: CPT | Mod: GC

## 2020-11-08 RX ORDER — SALIVA SUBSTITUTE COMB NO.11 351 MG
5 POWDER IN PACKET (EA) MUCOUS MEMBRANE
Refills: 0 | Status: DISCONTINUED | OUTPATIENT
Start: 2020-11-08 | End: 2020-11-10

## 2020-11-08 RX ORDER — HYDROCHLOROTHIAZIDE 25 MG
12.5 TABLET ORAL DAILY
Refills: 0 | Status: DISCONTINUED | OUTPATIENT
Start: 2020-11-08 | End: 2020-11-10

## 2020-11-08 RX ORDER — MULTIVIT WITH MIN/MFOLATE/K2 340-15/3 G
1 POWDER (GRAM) ORAL ONCE
Refills: 0 | Status: COMPLETED | OUTPATIENT
Start: 2020-11-08 | End: 2020-11-08

## 2020-11-08 RX ORDER — SODIUM CHLORIDE 9 MG/ML
1000 INJECTION, SOLUTION INTRAVENOUS
Refills: 0 | Status: DISCONTINUED | OUTPATIENT
Start: 2020-11-08 | End: 2020-11-10

## 2020-11-08 RX ORDER — LACTULOSE 10 G/15ML
200 SOLUTION ORAL ONCE
Refills: 0 | Status: COMPLETED | OUTPATIENT
Start: 2020-11-08 | End: 2020-11-08

## 2020-11-08 RX ORDER — MINERAL OIL
30 OIL (ML) MISCELLANEOUS ONCE
Refills: 0 | Status: COMPLETED | OUTPATIENT
Start: 2020-11-08 | End: 2020-11-08

## 2020-11-08 RX ADMIN — AMLODIPINE BESYLATE 2.5 MILLIGRAM(S): 2.5 TABLET ORAL at 17:11

## 2020-11-08 RX ADMIN — SODIUM CHLORIDE 100 MILLILITER(S): 9 INJECTION, SOLUTION INTRAVENOUS at 17:48

## 2020-11-08 RX ADMIN — LACTULOSE 200 GRAM(S): 10 SOLUTION ORAL at 16:07

## 2020-11-08 RX ADMIN — MORPHINE SULFATE 2 MILLIGRAM(S): 50 CAPSULE, EXTENDED RELEASE ORAL at 02:13

## 2020-11-08 RX ADMIN — IRBESARTAN 150 MILLIGRAM(S): 75 TABLET ORAL at 06:20

## 2020-11-08 RX ADMIN — AMLODIPINE BESYLATE 2.5 MILLIGRAM(S): 2.5 TABLET ORAL at 06:17

## 2020-11-08 RX ADMIN — CELECOXIB 200 MILLIGRAM(S): 200 CAPSULE ORAL at 06:16

## 2020-11-08 RX ADMIN — Medication 250 MILLIGRAM(S): at 17:13

## 2020-11-08 RX ADMIN — MORPHINE SULFATE 2 MILLIGRAM(S): 50 CAPSULE, EXTENDED RELEASE ORAL at 09:15

## 2020-11-08 RX ADMIN — Medication 10 MILLIGRAM(S): at 10:46

## 2020-11-08 RX ADMIN — Medication 12.5 MILLIGRAM(S): at 09:08

## 2020-11-08 RX ADMIN — CELECOXIB 200 MILLIGRAM(S): 200 CAPSULE ORAL at 17:16

## 2020-11-08 RX ADMIN — Medication 81 MILLIGRAM(S): at 17:11

## 2020-11-08 RX ADMIN — MORPHINE SULFATE 2 MILLIGRAM(S): 50 CAPSULE, EXTENDED RELEASE ORAL at 13:25

## 2020-11-08 RX ADMIN — Medication 30 MILLILITER(S): at 06:16

## 2020-11-08 RX ADMIN — POLYETHYLENE GLYCOL 3350 17 GRAM(S): 17 POWDER, FOR SOLUTION ORAL at 06:17

## 2020-11-08 RX ADMIN — Medication 5 MILLILITER(S): at 13:32

## 2020-11-08 RX ADMIN — Medication 81 MILLIGRAM(S): at 06:17

## 2020-11-08 RX ADMIN — Medication 30 MILLILITER(S): at 17:11

## 2020-11-08 RX ADMIN — CELECOXIB 200 MILLIGRAM(S): 200 CAPSULE ORAL at 17:11

## 2020-11-08 RX ADMIN — MORPHINE SULFATE 2 MILLIGRAM(S): 50 CAPSULE, EXTENDED RELEASE ORAL at 13:45

## 2020-11-08 RX ADMIN — PANTOPRAZOLE SODIUM 40 MILLIGRAM(S): 20 TABLET, DELAYED RELEASE ORAL at 06:19

## 2020-11-08 RX ADMIN — ONDANSETRON 4 MILLIGRAM(S): 8 TABLET, FILM COATED ORAL at 10:46

## 2020-11-08 RX ADMIN — MORPHINE SULFATE 2 MILLIGRAM(S): 50 CAPSULE, EXTENDED RELEASE ORAL at 08:51

## 2020-11-08 RX ADMIN — Medication 250 MILLIGRAM(S): at 06:09

## 2020-11-08 NOTE — PROGRESS NOTE ADULT - SUBJECTIVE AND OBJECTIVE BOX
GASTROENTEROLOGY PROGRESS NOTE  Patient seen and examined at bedside. Had 2 BMs yday after enema however still reports constipation.     PERTINENT REVIEW OF SYSTEMS:  As noted above    Allergies    carvedilol (Unknown)  codeine (Unknown)  conjugated estrogens (Unknown)  doxycycline (Unknown)  Levaquin (Unknown)  lubiprostone (Unknown)  nitrofurantoin (Unknown)  nortriptyline (Unknown)  penicillin (Unknown)  Tylenol (Rash)    Intolerances      MEDICATIONS:  MEDICATIONS  (STANDING):  amLODIPine   Tablet 2.5 milliGRAM(s) Oral <User Schedule>  aspirin enteric coated 81 milliGRAM(s) Oral two times a day  calcium carbonate 1250 mG  + Vitamin D (OsCal 500 + D) 1 Tablet(s) Oral three times a day  celecoxib 200 milliGRAM(s) Oral two times a day  hydrochlorothiazide 12.5 milliGRAM(s) Oral daily  irbesartan 150 milliGRAM(s) Oral daily  lactated ringers. 1000 milliLiter(s) (125 mL/Hr) IV Continuous <Continuous>  pantoprazole    Tablet 40 milliGRAM(s) Oral before breakfast  polyethylene glycol 3350 17 Gram(s) Oral two times a day  povidone iodine 10% Solution 1 Application(s) Topical once  povidone iodine 5% Nasal Swab 1 Application(s) Both Nostrils once  saccharomyces boulardii 250 milliGRAM(s) Oral two times a day    MEDICATIONS  (PRN):  aluminum hydroxide/magnesium hydroxide/simethicone Suspension 30 milliLiter(s) Oral four times a day PRN Indigestion  artificial tears (preservative free) Ophthalmic Solution 1 Drop(s) Both EYES three times a day PRN Dry Eyes  Biotene Dry Mouth Oral Rinse 5 milliLiter(s) Swish and Spit four times a day PRN dry mouth  bisacodyl Suppository 10 milliGRAM(s) Rectal daily PRN If no bowel movement by POD#2  diphenhydrAMINE 50 milliGRAM(s) Oral every 6 hours PRN Rash and/or Itching  magnesium hydroxide Suspension 30 milliLiter(s) Oral daily PRN Constipation  metoclopramide Injectable 10 milliGRAM(s) IV Push every 6 hours PRN Nausea and/or Vomiting, second line  morphine  - Injectable 2 milliGRAM(s) IV Push every 4 hours PRN breakthrough pain  ondansetron Injectable 4 milliGRAM(s) IV Push every 6 hours PRN Nausea and/or Vomiting  oxyCODONE    IR 5 milliGRAM(s) Oral every 4 hours PRN Moderate Pain (4 - 6)  oxyCODONE    IR 10 milliGRAM(s) Oral every 4 hours PRN Severe Pain (7 - 10)  senna 2 Tablet(s) Oral at bedtime PRN Constipation    Vital Signs Last 24 Hrs  T(C): 37.1 (08 Nov 2020 13:22), Max: 37.1 (08 Nov 2020 13:22)  T(F): 98.8 (08 Nov 2020 13:22), Max: 98.8 (08 Nov 2020 13:22)  HR: 92 (08 Nov 2020 13:22) (85 - 92)  BP: 147/73 (08 Nov 2020 13:22) (101/61 - 152/82)  BP(mean): --  RR: 18 (08 Nov 2020 13:22) (16 - 18)  SpO2: 94% (08 Nov 2020 13:22) (93% - 95%)    11-08 @ 07:01  -  11-08 @ 16:29  --------------------------------------------------------  IN: 600 mL / OUT: 500 mL / NET: 100 mL      PHYSICAL EXAM:    General: elderly, well nourished; in no acute distress  HEENT: MMM, conjunctiva and sclera clear  Gastrointestinal: Soft non-tender non-distended; No rebound or guarding  Skin: Warm and dry. No obvious rash    LABS:                        8.6    10.67 )-----------( 320      ( 08 Nov 2020 05:43 )             26.2     11-08    132<L>  |  90<L>  |  8   ----------------------------<  132<H>  3.1<L>   |  32<H>  |  0.59    Ca    8.8      08 Nov 2020 05:43    TPro  5.1<L>  /  Alb  2.5<L>  /  TBili  0.3  /  DBili  x   /  AST  89<H>  /  ALT  151<H>  /  AlkPhos  227<H>  11-08                      RADIOLOGY & ADDITIONAL STUDIES:  Reviewed

## 2020-11-08 NOTE — PROGRESS NOTE ADULT - ATTENDING COMMENTS
89 year old F w/ PMH of HTN, osteoporosis p/w L femoral neck fracture s/p L IM nail, course c/b transminitis of unclear etiology, now downtrending. Post-op c/o pain and constipation.     1-Ileus/constipation- having BM on current regimen no evidence of bowel obstruction  2- ANemia- likely post-op will continue to monitor  3- Transaminitis: downtrending, likely drug induced   4- Hyponatremia: asymptomatic, stable possibly siadh  5- HTN- c/w current regimen  resumed home HCTZ today

## 2020-11-08 NOTE — PROGRESS NOTE ADULT - SUBJECTIVE AND OBJECTIVE BOX
Orthopedics Progress Note    Pt. stable, laying in bed comfortably in bed. Denies CP, SOB, N/V, numbness/tingling in b/l les. Had BM yesterday    Vital Signs Last 24 Hrs  T(C): 36.9 (08 Nov 2020 06:35), Max: 36.9 (08 Nov 2020 06:35)  T(F): 98.5 (08 Nov 2020 06:35), Max: 98.5 (08 Nov 2020 06:35)  HR: 85 (08 Nov 2020 06:35) (85 - 92)  BP: 141/62 (08 Nov 2020 06:35) (101/61 - 141/62)  BP(mean): --  RR: 16 (08 Nov 2020 06:35) (15 - 16)  SpO2: 94% (08 Nov 2020 06:35) (94% - 95%)    Dressing C/D/I    Pulses: DP/PT 2+ B/L LES  SLT: intact B/L LES  Motor:  EHL/FHL/TA/GS 5/5 b/l les                      A/P: 89yoFemale s/p left thr on 11/5   - Stable  - Pain Control  - DVT ppx: asa  - Post op abx: ancef  - PT, WBS: wbat b/l les  - F/U AM Labs  - appreciate GI recs  - dispo: pending RONNY

## 2020-11-08 NOTE — PROGRESS NOTE ADULT - ASSESSMENT
89 year old F w/ PMH of HTN, osteoporosis p/w L femoral neck fracture s/p L IM nail. GI is consulted for transaminitis and constipation    # Transaminitis  had elevation in ALP, AST and ALT on admission  Then fluctuated during the hospital course and now downtrending again  Normal INR and T.bili throughout  Hep screen negative  US doppler: Mildly prominent common bile duct, probably related to age and postcholecystectomy state.  Most likely etiology is DILI. Given the LT are improving spontaneously, would continue to monitor and avoid extensive further work up at this time  Avoid hepatotoxins    # Chronic idiopathic constipation  Had BM after fleet enema yday, none today despite miralax and dulcolax supp  - Clear impacted stool with enema, PO dulcolax 10 mg and miralax   - once stool impaction resolved, use Miralax daily +/- senna nightly for maintenance  - fluid intake ~64oz/day  - High fiber diet or benefiber 1tbsp BID  - avoid opiates    Will sign off. Call back PRN    Recommendations discussed with primary team  Plan discussed with GI service attending    Deon Huitron MD  PGY-4 GI fellow  Pager: [phone]

## 2020-11-08 NOTE — PROGRESS NOTE ADULT - SUBJECTIVE AND OBJECTIVE BOX
pt had BM.   no nausea or vomiting  ros otherwise negative     VITAL SIGNS:  Vital Signs Last 24 Hrs  T(C): 36.9 (06 Nov 2020 12:38), Max: 36.9 (06 Nov 2020 00:50)  T(F): 98.4 (06 Nov 2020 12:38), Max: 98.4 (06 Nov 2020 00:50)  HR: 84 (06 Nov 2020 12:38) (79 - 98)  BP: 129/56 (06 Nov 2020 12:38) (129/56 - 164/72)  BP(mean): --  RR: 18 (06 Nov 2020 12:38) (14 - 18)  SpO2: 100% (06 Nov 2020 12:38) (94% - 100%)    PHYSICAL EXAM:    General: WDWN, appears younger than stated age  HEENT: NC/AT;  Neck: supple  Cardiovascular: +S1/S2; RRR  Respiratory: CTA b/l  Gastrointestinal: RUQ nontender, soft, NT/ND; +BSx4  Extremities: WWP; L hip dressing c/d/i  MEDICATIONS:  MEDICATIONS  (STANDING):  amLODIPine   Tablet 2.5 milliGRAM(s) Oral <User Schedule>  aspirin enteric coated 81 milliGRAM(s) Oral two times a day  calcium carbonate 1250 mG  + Vitamin D (OsCal 500 + D) 1 Tablet(s) Oral three times a day  celecoxib 200 milliGRAM(s) Oral two times a day  irbesartan 150 milliGRAM(s) Oral daily  lactated ringers. 1000 milliLiter(s) (125 mL/Hr) IV Continuous <Continuous>  losartan 50 milliGRAM(s) Oral daily  pantoprazole    Tablet 40 milliGRAM(s) Oral before breakfast  polyethylene glycol 3350 17 Gram(s) Oral daily  povidone iodine 10% Solution 1 Application(s) Topical once  povidone iodine 5% Nasal Swab 1 Application(s) Both Nostrils once  saccharomyces boulardii 250 milliGRAM(s) Oral two times a day  saline laxative (FLEET) Rectal Enema 1 Enema Rectal once  sodium chloride 0.9%. 500 milliLiter(s) (100 mL/Hr) IV Continuous <Continuous>    MEDICATIONS  (PRN):  aluminum hydroxide/magnesium hydroxide/simethicone Suspension 30 milliLiter(s) Oral four times a day PRN Indigestion  artificial tears (preservative free) Ophthalmic Solution 1 Drop(s) Both EYES three times a day PRN Dry Eyes  diphenhydrAMINE 50 milliGRAM(s) Oral every 6 hours PRN Rash and/or Itching  magnesium hydroxide Suspension 30 milliLiter(s) Oral daily PRN Constipation  metoclopramide Injectable 10 milliGRAM(s) IV Push every 6 hours PRN Nausea and/or Vomiting, second line  morphine  - Injectable 2 milliGRAM(s) IV Push every 4 hours PRN breakthrough pain  ondansetron Injectable 4 milliGRAM(s) IV Push every 6 hours PRN Nausea and/or Vomiting  oxyCODONE    IR 5 milliGRAM(s) Oral every 4 hours PRN Moderate Pain (4 - 6)  oxyCODONE    IR 10 milliGRAM(s) Oral every 4 hours PRN Severe Pain (7 - 10)  senna 2 Tablet(s) Oral at bedtime PRN Constipation      ALLERGIES:  Allergies    carvedilol (Unknown)  codeine (Unknown)  conjugated estrogens (Unknown)  doxycycline (Unknown)  Levaquin (Unknown)  lubiprostone (Unknown)  nitrofurantoin (Unknown)  nortriptyline (Unknown)  penicillin (Unknown)  Tylenol (Rash)    Intolerances        LABS:                        10.3   12.99 )-----------( 360      ( 06 Nov 2020 10:40 )             31.0     11-06    131<L>  |  90<L>  |  10  ----------------------------<  115<H>  3.8   |  30  |  0.50    Ca    9.2      06 Nov 2020 10:40    TPro  5.4<L>  /  Alb  3.1<L>  /  TBili  0.2  /  DBili  <0.2  /  AST  153<H>  /  ALT  254<H>  /  AlkPhos  214<H>  11-05        CAPILLARY BLOOD GLUCOSE          RADIOLOGY & ADDITIONAL TESTS: Reviewed.    ASSESSMENT:    PLAN:

## 2020-11-09 LAB
ANION GAP SERPL CALC-SCNC: 9 MMOL/L — SIGNIFICANT CHANGE UP (ref 5–17)
BUN SERPL-MCNC: 7 MG/DL — SIGNIFICANT CHANGE UP (ref 7–23)
CALCIUM SERPL-MCNC: 8.8 MG/DL — SIGNIFICANT CHANGE UP (ref 8.4–10.5)
CHLORIDE SERPL-SCNC: 92 MMOL/L — LOW (ref 96–108)
CO2 SERPL-SCNC: 32 MMOL/L — HIGH (ref 22–31)
CREAT SERPL-MCNC: 0.5 MG/DL — SIGNIFICANT CHANGE UP (ref 0.5–1.3)
GLUCOSE SERPL-MCNC: 161 MG/DL — HIGH (ref 70–99)
HCT VFR BLD CALC: 30.1 % — LOW (ref 34.5–45)
HGB BLD-MCNC: 9.6 G/DL — LOW (ref 11.5–15.5)
MCHC RBC-ENTMCNC: 28 PG — SIGNIFICANT CHANGE UP (ref 27–34)
MCHC RBC-ENTMCNC: 31.9 GM/DL — LOW (ref 32–36)
MCV RBC AUTO: 87.8 FL — SIGNIFICANT CHANGE UP (ref 80–100)
NRBC # BLD: 0 /100 WBCS — SIGNIFICANT CHANGE UP (ref 0–0)
PLATELET # BLD AUTO: 432 K/UL — HIGH (ref 150–400)
POTASSIUM SERPL-MCNC: 3 MMOL/L — LOW (ref 3.5–5.3)
POTASSIUM SERPL-SCNC: 3 MMOL/L — LOW (ref 3.5–5.3)
RBC # BLD: 3.43 M/UL — LOW (ref 3.8–5.2)
RBC # FLD: 13.2 % — SIGNIFICANT CHANGE UP (ref 10.3–14.5)
SARS-COV-2 RNA SPEC QL NAA+PROBE: SIGNIFICANT CHANGE UP
SODIUM SERPL-SCNC: 133 MMOL/L — LOW (ref 135–145)
WBC # BLD: 8.39 K/UL — SIGNIFICANT CHANGE UP (ref 3.8–10.5)
WBC # FLD AUTO: 8.39 K/UL — SIGNIFICANT CHANGE UP (ref 3.8–10.5)

## 2020-11-09 PROCEDURE — 99233 SBSQ HOSP IP/OBS HIGH 50: CPT

## 2020-11-09 RX ORDER — POTASSIUM CHLORIDE 20 MEQ
40 PACKET (EA) ORAL EVERY 4 HOURS
Refills: 0 | Status: DISCONTINUED | OUTPATIENT
Start: 2020-11-09 | End: 2020-11-10

## 2020-11-09 RX ORDER — CALCIUM CARBONATE 500(1250)
2 TABLET ORAL DAILY
Refills: 0 | Status: DISCONTINUED | OUTPATIENT
Start: 2020-11-09 | End: 2020-11-10

## 2020-11-09 RX ORDER — MINERAL OIL
30 OIL (ML) MISCELLANEOUS DAILY
Refills: 0 | Status: DISCONTINUED | OUTPATIENT
Start: 2020-11-09 | End: 2020-11-10

## 2020-11-09 RX ORDER — POTASSIUM CHLORIDE 20 MEQ
40 PACKET (EA) ORAL EVERY 4 HOURS
Refills: 0 | Status: DISCONTINUED | OUTPATIENT
Start: 2020-11-09 | End: 2020-11-09

## 2020-11-09 RX ORDER — LACTULOSE 10 G/15ML
10 SOLUTION ORAL DAILY
Refills: 0 | Status: DISCONTINUED | OUTPATIENT
Start: 2020-11-09 | End: 2020-11-10

## 2020-11-09 RX ORDER — SIMETHICONE 80 MG/1
80 TABLET, CHEWABLE ORAL ONCE
Refills: 0 | Status: COMPLETED | OUTPATIENT
Start: 2020-11-09 | End: 2020-11-09

## 2020-11-09 RX ADMIN — Medication 30 MILLILITER(S): at 13:01

## 2020-11-09 RX ADMIN — Medication 2 TABLET(S): at 16:14

## 2020-11-09 RX ADMIN — Medication 12.5 MILLIGRAM(S): at 06:59

## 2020-11-09 RX ADMIN — LACTULOSE 10 GRAM(S): 10 SOLUTION ORAL at 16:14

## 2020-11-09 RX ADMIN — PANTOPRAZOLE SODIUM 40 MILLIGRAM(S): 20 TABLET, DELAYED RELEASE ORAL at 06:59

## 2020-11-09 RX ADMIN — Medication 10 MILLIGRAM(S): at 21:18

## 2020-11-09 RX ADMIN — CELECOXIB 200 MILLIGRAM(S): 200 CAPSULE ORAL at 06:59

## 2020-11-09 RX ADMIN — Medication 81 MILLIGRAM(S): at 23:38

## 2020-11-09 RX ADMIN — Medication 81 MILLIGRAM(S): at 06:59

## 2020-11-09 RX ADMIN — IRBESARTAN 150 MILLIGRAM(S): 75 TABLET ORAL at 07:01

## 2020-11-09 RX ADMIN — AMLODIPINE BESYLATE 2.5 MILLIGRAM(S): 2.5 TABLET ORAL at 06:59

## 2020-11-09 RX ADMIN — MORPHINE SULFATE 2 MILLIGRAM(S): 50 CAPSULE, EXTENDED RELEASE ORAL at 11:17

## 2020-11-09 RX ADMIN — Medication 40 MILLIEQUIVALENT(S): at 13:49

## 2020-11-09 RX ADMIN — SIMETHICONE 80 MILLIGRAM(S): 80 TABLET, CHEWABLE ORAL at 19:21

## 2020-11-09 RX ADMIN — MORPHINE SULFATE 2 MILLIGRAM(S): 50 CAPSULE, EXTENDED RELEASE ORAL at 11:35

## 2020-11-09 RX ADMIN — Medication 250 MILLIGRAM(S): at 06:59

## 2020-11-09 RX ADMIN — Medication 5 MILLILITER(S): at 16:08

## 2020-11-09 RX ADMIN — MORPHINE SULFATE 2 MILLIGRAM(S): 50 CAPSULE, EXTENDED RELEASE ORAL at 08:45

## 2020-11-09 RX ADMIN — Medication 5 MILLILITER(S): at 10:39

## 2020-11-09 RX ADMIN — MORPHINE SULFATE 2 MILLIGRAM(S): 50 CAPSULE, EXTENDED RELEASE ORAL at 07:36

## 2020-11-09 RX ADMIN — AMLODIPINE BESYLATE 2.5 MILLIGRAM(S): 2.5 TABLET ORAL at 16:06

## 2020-11-09 RX ADMIN — SODIUM CHLORIDE 100 MILLILITER(S): 9 INJECTION, SOLUTION INTRAVENOUS at 13:51

## 2020-11-09 RX ADMIN — MAGNESIUM HYDROXIDE 30 MILLILITER(S): 400 TABLET, CHEWABLE ORAL at 23:38

## 2020-11-09 RX ADMIN — CELECOXIB 200 MILLIGRAM(S): 200 CAPSULE ORAL at 08:45

## 2020-11-09 NOTE — DIETITIAN INITIAL EVALUATION ADULT. - OTHER INFO
89yo woman with a PMH of HTN, osteoporosis and anxiety presenting after transfer from [facility] with L hip pain and inability to bear weight for approx 1 week after feeling a sharp pain in the hip while opening her shower door. Now, s/p L hip IMN EDVIN, core decompression, bone graft.     Pt seen laying in bed, alert and receptive during AM visit. Pt expressed concern for loose BMs on 11/9 with first BM since admission; on bowel regimen. Pt has not eaten in 11 days, first meal this AM per rounds. Pt reports having omelet with cheese for breakfast however states periodic nausea and no appetite causing poor PO intake; pt likely meeting <50% EER since admission. No chewing or swallowing difficulties. NFKA; pt reports a lactose intolerance. Prior to admission, pt reports good appetite of 3 meals per day however appetite has declined d/t persistent pain s/p hip pain. Pt reports taking probiotics PTA. Pt states UBW of 112-115 lbs and a 10 lb weight loss since April 12, 2019; UBW c/w ABW of ~115 lbs. No edema noted per EMR. Alon 20. Discussed with patient ONS supplementation to increase nutrient needs, pt accepted Ensure chocolate pudding. Please see below for full nutrition recommendations and NFPE. RD to monitor and f/u per protocol. 89yo F with a PMH of HTN, osteoporosis and anxiety presenting after transfer from [facility] with L hip pain and inability to bear weight for approx 1 week after feeling a sharp pain in the hip while opening her shower door. Now, s/p L hip IMN EDVIN, core decompression, bone graft on 11/5.     Pt seen laying in bed, alert and receptive during AM visit. Of note, difficult to obtain all information regarding diet/wt hx from pt. Per report from AM rounds, pt has not eaten in 11 days 2/2 constipation, first meal this AM per rounds, RN states pt ate >75% of breakfast. Pt reports having omelet with cheese for breakfast but states overall she feels like she has a limited appetite at this time. Pt states she doesn't eat a lot of food at baseline; denied changes in appetite PTA. Denies chewing or swallowing difficulties. Pt denies food allergies but reports lactose intolerance. Pt reports taking probiotics PTA. Pt states recent UBW of 112-115 lbs; pt likely 10 lb weight loss since April 12, 2019; UBW c/w ABW of ~115 lbs. No edema noted per EMR. Alon 20. Discussed with patient ONS supplementation to increase nutrient needs, pt accepted Ensure chocolate pudding. Please see below for full nutrition recommendations and NFPE. RD to monitor and f/u per protocol.    Pt expressed concern for loose BMs on 11/9 with first BM since admission; on bowel regimen. 89yo F with a PMH of HTN, osteoporosis and anxiety presenting after transfer from [facility] with L hip pain and inability to bear weight for approx 1 week after feeling a sharp pain in the hip while opening her shower door. Now, s/p L hip IMN EDVIN, core decompression, bone graft on 11/5.     Pt seen laying in bed, alert and receptive during AM visit. Of note, difficult to obtain all information regarding diet/wt hx from pt. Per report from AM rounds, pt has not eaten in 11 days 2/2 constipation, first meal this AM per rounds, RN states pt ate >75% of breakfast. Pt reports having omelet with cheese for breakfast but states overall she feels like she has a limited appetite at this time. Pt states she doesn't eat a lot of food at baseline; denied changes in appetite PTA. Denies chewing or swallowing difficulties. Pt endorses occasional nausea but no vomiting (ordered for zofran). Pt denies food allergies but reports lactose intolerance. Pt reports taking probiotics PTA. Pt states recent UBW of 112-115 lbs; pt states she has had a 10 lb weight loss since April 12, 2019 but unable to verify as pt unable to state her weight at that time and no records to confirm loss. Of note, last admission pt weighed in at 112lb (9/3/2020). documented weight on this [id number] (11/4). No edema noted per EMR. Alon 20. Of note, pt with constipation on arrival, s/p enema on 11/8, +losse BM yesterday, pt denies flatus at this time. Discussed with patient ONS supplementation to increase nutrient needs, pt accepted Ensure chocolate pudding. Please see below for full nutrition recommendations and NFPE-d/w team. RD to monitor and f/u per protocol.    Pt expressed concern for loose BMs on 11/9 with first BM since admission; on bowel regimen.

## 2020-11-09 NOTE — PROGRESS NOTE ADULT - SUBJECTIVE AND OBJECTIVE BOX
Patient is a 89y old  Female who presents with a chief complaint of     INTERVAL HPI/OVERNIGHT EVENTS:  Seen by me this afternoon, daughter at bedside. +BM yesterday after an enema was given, pain overall under control. Using IS.    Review of Systems: 12 point review of systems otherwise negative    MEDICATIONS  (STANDING):  amLODIPine   Tablet 2.5 milliGRAM(s) Oral <User Schedule>  aspirin enteric coated 81 milliGRAM(s) Oral two times a day  calcium carbonate    500 mG (Tums) Chewable 2 Tablet(s) Chew daily  calcium carbonate 1250 mG  + Vitamin D (OsCal 500 + D) 1 Tablet(s) Oral three times a day  celecoxib 200 milliGRAM(s) Oral two times a day  hydrochlorothiazide 12.5 milliGRAM(s) Oral daily  irbesartan 150 milliGRAM(s) Oral daily  lactated ringers. 1000 milliLiter(s) (100 mL/Hr) IV Continuous <Continuous>  pantoprazole    Tablet 40 milliGRAM(s) Oral before breakfast  polyethylene glycol 3350 17 Gram(s) Oral two times a day  potassium chloride   Powder 40 milliEquivalent(s) Oral every 4 hours  povidone iodine 10% Solution 1 Application(s) Topical once  povidone iodine 5% Nasal Swab 1 Application(s) Both Nostrils once  saccharomyces boulardii 250 milliGRAM(s) Oral two times a day    MEDICATIONS  (PRN):  aluminum hydroxide/magnesium hydroxide/simethicone Suspension 30 milliLiter(s) Oral four times a day PRN Indigestion  artificial tears (preservative free) Ophthalmic Solution 1 Drop(s) Both EYES three times a day PRN Dry Eyes  Biotene Dry Mouth Oral Rinse 5 milliLiter(s) Swish and Spit four times a day PRN dry mouth  bisacodyl Suppository 10 milliGRAM(s) Rectal daily PRN If no bowel movement by POD#2  lactulose Syrup 10 Gram(s) Oral daily PRN constipation  magnesium hydroxide Suspension 30 milliLiter(s) Oral daily PRN Constipation  metoclopramide Injectable 10 milliGRAM(s) IV Push every 6 hours PRN Nausea and/or Vomiting, second line  mineral oil 30 milliLiter(s) Oral daily PRN constipation  morphine  - Injectable 2 milliGRAM(s) IV Push every 4 hours PRN breakthrough pain  ondansetron Injectable 4 milliGRAM(s) IV Push every 6 hours PRN Nausea and/or Vomiting  oxyCODONE    IR 10 milliGRAM(s) Oral every 4 hours PRN Severe Pain (7 - 10)  oxyCODONE    IR 5 milliGRAM(s) Oral every 4 hours PRN Moderate Pain (4 - 6)  senna 2 Tablet(s) Oral at bedtime PRN Constipation      Allergies    carvedilol (Unknown)  codeine (Unknown)  conjugated estrogens (Unknown)  doxycycline (Unknown)  Levaquin (Unknown)  lubiprostone (Unknown)  nitrofurantoin (Unknown)  nortriptyline (Unknown)  penicillin (Unknown)  Tylenol (Rash)    Intolerances          Vital Signs Last 24 Hrs  T(C): 36.9 (09 Nov 2020 20:45), Max: 37.3 (09 Nov 2020 08:45)  T(F): 98.4 (09 Nov 2020 20:45), Max: 99.1 (09 Nov 2020 08:45)  HR: 96 (09 Nov 2020 20:45) (72 - 96)  BP: 168/85 (09 Nov 2020 20:45) (135/78 - 168/85)  BP(mean): --  RR: 17 (09 Nov 2020 20:45) (16 - 17)  SpO2: 99% (09 Nov 2020 20:45) (93% - 100%)  CAPILLARY BLOOD GLUCOSE          11-08 @ 07:01  -  11-09 @ 07:00  --------------------------------------------------------  IN: 820 mL / OUT: 700 mL / NET: 120 mL    11-09 @ 07:01  -  11-09 @ 21:53  --------------------------------------------------------  IN: 1200 mL / OUT: 1000 mL / NET: 200 mL        Physical Exam:    Daily     Daily   General:  Well appearing, NAD, not cachetic  HEENT:  Nonicteric, PERRLA  CV:  RRR, S1S2 ok  Lungs:  CTA B/L, no wheezes, rales, rhonchi  Abdomen:  Soft, non-tender, no distended, positive BS, no hepatosplenomegaly  Extremities:  no edema. Left hip dressing on place, C/D/I  Skin:  Warm and dry, no rashes  :  No barrios  Neuro:  AAOx3, non-focal, CN II-XII grossly intact  No Restraints    LABS:                        9.6    8.39  )-----------( 432      ( 09 Nov 2020 11:13 )             30.1     11-09    133<L>  |  92<L>  |  7   ----------------------------<  161<H>  3.0<L>   |  32<H>  |  0.50    Ca    8.8      09 Nov 2020 11:13    TPro  5.1<L>  /  Alb  2.5<L>  /  TBili  0.3  /  DBili  x   /  AST  89<H>  /  ALT  151<H>  /  AlkPhos  227<H>  11-08            RADIOLOGY & ADDITIONAL TESTS:

## 2020-11-09 NOTE — DIETITIAN INITIAL EVALUATION ADULT. - MUSCLE MASS (LOSS OF MUSCLE)
Temples.../Scapula.../Thigh.../Dorsal hand.../Clavicles.../Shoulders... Clavicles.../Shoulders.../Scapula.../Dorsal hand.../Temples...

## 2020-11-09 NOTE — PROGRESS NOTE ADULT - SUBJECTIVE AND OBJECTIVE BOX
Orthopaedic Surgery Progress Note    Post-operative day #6 s/p L hip removal of IMN, core decompression, and bone graft     Subjective:     Patient seen and examined. States she is concerned about having BMs. She states she had 1 yesterday after an enema. She would like to try something different today. She states mineral oil has helped in the past. She states that she ate breakfast this morning and it was the first food she has had since coming to the hospital. When asked why, she does not directly answer. She states her abdomen was very distended yesterday, but better today. Denies chest pain, shortness of breath, nausea/vomiting, numbness/tingling.    Objective:    Vital Signs Last 24 Hrs  T(C): 37.3 (11-09-20 @ 08:45), Max: 37.3 (11-09-20 @ 08:45)  T(F): 99.1 (11-09-20 @ 08:45), Max: 99.1 (11-09-20 @ 08:45)  HR: 83 (11-09-20 @ 08:45) (83 - 83)  BP: 147/78 (11-09-20 @ 08:45) (135/78 - 147/78)  BP(mean): --  RR: 16 (11-09-20 @ 08:45) (16 - 16)  SpO2: 100% (11-09-20 @ 08:45) (93% - 100%)  AVSS    PE:  General: Patient alert and oriented, NAD  Abdomen nontender, nondistended  Dressing: Clean/dry/intact  Pulses: 2+ DP BLE   Sensation: SILT BLE   Motor: EHL/FHL/TA/GS 5/5 BLE                          9.6    8.39  )-----------( 432      ( 09 Nov 2020 11:13 )             30.1   09 Nov 2020 11:13    133    |  92     |  7      ----------------------------<  161    3.0     |  32     |  0.50     Ca    8.8        09 Nov 2020 11:13    TPro  5.1    /  Alb  2.5    /  TBili  0.3    /  DBili  x      /  AST  89     /  ALT  151    /  AlkPhos  227    08 Nov 2020 05:43      A/P: 89yo Female POD#6 s/p L hip IMN EDVIN, core decompression, bone graft   1. Pain control as needed. Patient states the only thing that works for her because she's "allergic to everything" is IV morphine. Explained that this was not ideal as she will not have IV at home. Patient began crying at this point stating that "no one is listening" to her. Reassured patient that we understand she has pain; however, she will need to transition to something oral prior to dc. She expressed understanding.  2. DVT prophylaxis: ASA   3. PT, weight-bearing status: WBAT   4. Additional laxatives added. Reassured patient that she has no evidence of ileus and since she had 2 BMs yesterday and does not usually have a BM daily at home, there is no reason to expect she will have a daily BM in the hospital. Encouraged oral intake and oral fluids. Appreciate GI recs.  5. Appreciate nutrition recs    6. 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 She is POD #6 from a L total hip replacement for a femoral neck fracture.     Subjective:     Patient seen and examined. States she is concerned about having BMs. She states she had 1 yesterday after an enema. She would like to try something different today. She states mineral oil has helped in the past. She states that she ate breakfast this morning and it was the first food she has had since coming to the hospital. When asked why, she does not directly answer. She states her abdomen was very distended yesterday, but better today. Denies chest pain, shortness of breath, nausea/vomiting, numbness/tingling.    Objective:    Vital Signs Last 24 Hrs  T(C): 37.3 (11-09-20 @ 08:45), Max: 37.3 (11-09-20 @ 08:45)  T(F): 99.1 (11-09-20 @ 08:45), Max: 99.1 (11-09-20 @ 08:45)  HR: 83 (11-09-20 @ 08:45) (83 - 83)  BP: 147/78 (11-09-20 @ 08:45) (135/78 - 147/78)  BP(mean): --  RR: 16 (11-09-20 @ 08:45) (16 - 16)  SpO2: 100% (11-09-20 @ 08:45) (93% - 100%)  AVSS    PE:  General: Patient alert and oriented, NAD  Abdomen nontender, nondistended  Dressing: Clean/dry/intact  Pulses: 2+ DP BLE   Sensation: SILT BLE   Motor: EHL/FHL/TA/GS 5/5 BLE                          9.6    8.39  )-----------( 432      ( 09 Nov 2020 11:13 )             30.1   09 Nov 2020 11:13    133    |  92     |  7      ----------------------------<  161    3.0     |  32     |  0.50     Ca    8.8        09 Nov 2020 11:13    TPro  5.1    /  Alb  2.5    /  TBili  0.3    /  DBili  x      /  AST  89     /  ALT  151    /  AlkPhos  227    08 Nov 2020 05:43      A/P: 89yo Female POD#6 s/p L hip IMN EDVIN, core decompression, bone graft   ADDENDUM: Patient is actually POD #6 L total hip replacement for a femoral neck fracture.   1. Pain control as needed. Patient states the only thing that works for her because she's "allergic to everything" is IV morphine. Explained that this was not ideal as she will not have IV at home. Patient began crying at this point stating that "no one is listening" to her. Reassured patient that we understand she has pain; however, she will need to transition to something oral prior to dc. She expressed understanding.  2. DVT prophylaxis: ASA   3. PT, weight-bearing status: WBAT   4. Additional laxatives added. Reassured patient that she has no evidence of ileus and since she had 2 BMs yesterday and does not usually have a BM daily at home, there is no reason to expect she will have a daily BM in the hospital. Encouraged oral intake and oral fluids. Appreciate GI recs.  5. Appreciate nutrition recs    6. Dispo: RONNY    Ortho Pager [phone]

## 2020-11-09 NOTE — DIETITIAN INITIAL EVALUATION ADULT. - PERTINENT MEDS FT
Aspirin, oxy, LR @ 100 ml/hr, dulcolax, miralax, norvasc, Ca & Vit D, magnesium hydroxide suspension, zofran, protonix, senna, reglan

## 2020-11-09 NOTE — DIETITIAN INITIAL EVALUATION ADULT. - ENERGY INTAKE
Pt likely meeting <25-50% EER since admission. Poor (<50%) Pt likely meeting <25-50% EER overall since admission.

## 2020-11-09 NOTE — DIETITIAN INITIAL EVALUATION ADULT. - MALNUTRITION
Moderate protein calorie malnutrition in chronic illness Per ASPEN guidelines, pt meets criteria for severe PCM (chronic) likely meeting <75% EER > 1 month PTA and muscle and fat loss. Per ASPEN guidelines, pt meets criteria for moderate PCM (chronic) likely meeting <75% EER > 1 month PTA and muscle and fat loss.

## 2020-11-09 NOTE — DIETITIAN INITIAL EVALUATION ADULT. - NUTRITIONGOAL OUTCOME1
Pt to meet >75% EER po intake with good tolerance and show no further signs and symptoms of PCM. Pt to meet >75% EER PO with good tolerance and show no further signs and symptoms of PCM.

## 2020-11-09 NOTE — DIETITIAN INITIAL EVALUATION ADULT. - ADD RECOMMEND
1. Continue regular diet; add Ensure pudding x 1 per day 2. Recommend MVI and probiotic 3. Monitor glucose and AST/ALT 4. Encourage >75% PO intake 1. Recommend continue regular diet 2. Recommend add MVI 3. Encourage >75% PO intake

## 2020-11-09 NOTE — DIETITIAN INITIAL EVALUATION ADULT. - LAB (SPECIFY)
Monitor BMP, electrolyes; replete K, AST/ALT, glucose Monitor BMP, electrolytes, LFTs; replete lytes prn

## 2020-11-09 NOTE — CHART NOTE - TREATMENT: THE FOLLOWING DIET HAS BEEN RECOMMENDED
Recommend continue regular diet 2. Recommend add MVI 3. Encourage >75% PO intake. Pt to meet >75% EER PO with good tolerance and show no further signs and symptoms of PCM. 1. Recommend continue regular diet 2. Recommend add MVI 3. Encourage >75% PO intake. Pt to meet >75% EER PO with good tolerance and show no further signs and symptoms of PCM.

## 2020-11-09 NOTE — PROGRESS NOTE ADULT - ASSESSMENT
89 year old F w/ PMH of HTN, osteoporosis p/w L femoral neck fracture s/p L IM nail, course c/b transaminitis of unclear etiology, now downtrending. Post-op c/o pain and constipation.     #Post-op care: c/w pain control, minimize opioid use, c/w aggressive bowel regimen (miralax, lactulose, enemas), c/w IS. DVT ppx with ASA BID. PT --> RONNY.    #Transaminitis: downtrending, unclear etiology, likely drug induced, continue to trend.    #Anemia: acute blood loss 2/2 surgery, trend H/H, goal >7, stable.    #Leukocytosis: likely reactive to surgery, no s/s of infection, resolved.    #Hyponatremia: asymptomatic, improved. Pre-renal vs SIADH. Continue to monitor.    #HTN: c/w irbesartan, norvasc (she was also on losartan for unclear reasons, no indication for two ARBs), HCTZ resumed yesterday.    # Hypokalemia: being replaced, f/up K level in AM, check Mg level.    # Moderate protein calorie malnutrition: evaluated by Nutrition, apprec recs.    D/w Ortho, will continue to follow up with you.   89 year old F w/ PMH of HTN, osteoporosis p/w L femoral neck fracture s/p L IM nail, course c/b transaminitis of unclear etiology, now downtrending. Post-op c/o pain and constipation.     #Post-op care: c/w pain control, minimize opioid use, c/w aggressive bowel regimen (miralax, lactulose, enemas), c/w IS. DVT ppx with ASA BID. PT --> RONNY.    #Transaminitis: downtrending, unclear etiology, likely drug induced, continue to trend.    #Anemia: acute blood loss 2/2 surgery, trend H/H, goal >7, stable.    #Leukocytosis: likely reactive to surgery, no s/s of infection, resolved.    #Hyponatremia: asymptomatic, improved. Pre-renal vs SIADH. Continue to monitor.    #HTN: c/w irbesartan, norvasc (she was also on losartan for unclear reasons, no indication for two ARBs), HCTZ resumed yesterday.    # Hypokalemia: being replaced, f/up K level in AM, check Mg level.    # Moderate protein calorie malnutrition: evaluated by Nutrition, apprec recs.    D/w Ortho, will continue to follow up with you.  D/w daughter at bedside.

## 2020-11-09 NOTE — DIETITIAN INITIAL EVALUATION ADULT. - ORAL NUTRITION SUPPLEMENTS
Ensure Enlive pudding (170 kcals, 4 g protein per serving) Recommend add Ensure Enlive pudding TID (each 170 kcals, 4 g protein)

## 2020-11-09 NOTE — PROGRESS NOTE ADULT - SUBJECTIVE AND OBJECTIVE BOX
Orthopedics Progress Note    Pt. stable, laying in bed comfortably in bed. Denies CP, SOB, N/V, numbness/tingling in b/l les.     Vital Signs Last 24 Hrs  T(C): 36.4 (09 Nov 2020 06:58), Max: 37.1 (08 Nov 2020 13:22)  T(F): 97.6 (09 Nov 2020 06:58), Max: 98.8 (08 Nov 2020 13:22)  HR: 83 (09 Nov 2020 06:58) (83 - 93)  BP: 135/78 (09 Nov 2020 06:58) (129/65 - 147/73)  BP(mean): --  RR: 16 (09 Nov 2020 06:58) (16 - 18)  SpO2: 93% (09 Nov 2020 06:58) (93% - 96%)    Dressing C/D/I    Pulses: DP/PT 2+ B/L LES  SLT: intact B/L LES  Motor:  EHL/FHL/TA/GS 5/5 b/l les                      A/P: 89yoFemale s/p left thr on 11/5   - Stable  - Pain Control  - DVT ppx: asa  - Post op abx: ancef  - PT, WBS: wbat b/l les  - F/U AM Labs  - appreciate GI recs  - dispo: pending RONNY

## 2020-11-09 NOTE — DIETITIAN INITIAL EVALUATION ADULT. - PERSON TAUGHT/METHOD
Encourages increased nutrient needs with ONS and oral diet with emphasis on increasing protein intake for healing. Pt seemed willing and receptive./patient instructed/verbal instruction verbal instruction/patient instructed/Encouraged increased nutrient needs with ONS and oral diet with emphasis on increasing protein intake for healing. Pt seemed willing and receptive.

## 2020-11-10 ENCOUNTER — TRANSCRIPTION ENCOUNTER (OUTPATIENT)
Age: 85
End: 2020-11-10

## 2020-11-10 VITALS
RESPIRATION RATE: 18 BRPM | SYSTOLIC BLOOD PRESSURE: 144 MMHG | DIASTOLIC BLOOD PRESSURE: 68 MMHG | OXYGEN SATURATION: 100 % | TEMPERATURE: 98 F | HEART RATE: 90 BPM

## 2020-11-10 LAB
ALBUMIN SERPL ELPH-MCNC: 2.7 G/DL — LOW (ref 3.3–5)
ALP SERPL-CCNC: 280 U/L — HIGH (ref 40–120)
ALT FLD-CCNC: 118 U/L — HIGH (ref 10–45)
ANION GAP SERPL CALC-SCNC: 11 MMOL/L — SIGNIFICANT CHANGE UP (ref 5–17)
AST SERPL-CCNC: 49 U/L — HIGH (ref 10–40)
BILIRUB SERPL-MCNC: 0.3 MG/DL — SIGNIFICANT CHANGE UP (ref 0.2–1.2)
BUN SERPL-MCNC: 6 MG/DL — LOW (ref 7–23)
CALCIUM SERPL-MCNC: 8.3 MG/DL — LOW (ref 8.4–10.5)
CHLORIDE SERPL-SCNC: 92 MMOL/L — LOW (ref 96–108)
CO2 SERPL-SCNC: 33 MMOL/L — HIGH (ref 22–31)
CREAT SERPL-MCNC: 0.47 MG/DL — LOW (ref 0.5–1.3)
GLUCOSE SERPL-MCNC: 119 MG/DL — HIGH (ref 70–99)
HCT VFR BLD CALC: 27.9 % — LOW (ref 34.5–45)
HGB BLD-MCNC: 8.9 G/DL — LOW (ref 11.5–15.5)
MAGNESIUM SERPL-MCNC: 1.6 MG/DL — SIGNIFICANT CHANGE UP (ref 1.6–2.6)
MCHC RBC-ENTMCNC: 27.6 PG — SIGNIFICANT CHANGE UP (ref 27–34)
MCHC RBC-ENTMCNC: 31.9 GM/DL — LOW (ref 32–36)
MCV RBC AUTO: 86.6 FL — SIGNIFICANT CHANGE UP (ref 80–100)
NRBC # BLD: 0 /100 WBCS — SIGNIFICANT CHANGE UP (ref 0–0)
PLATELET # BLD AUTO: 393 K/UL — SIGNIFICANT CHANGE UP (ref 150–400)
POTASSIUM SERPL-MCNC: 3 MMOL/L — LOW (ref 3.5–5.3)
POTASSIUM SERPL-SCNC: 3 MMOL/L — LOW (ref 3.5–5.3)
PROT SERPL-MCNC: 5.2 G/DL — LOW (ref 6–8.3)
RBC # BLD: 3.22 M/UL — LOW (ref 3.8–5.2)
RBC # FLD: 13.1 % — SIGNIFICANT CHANGE UP (ref 10.3–14.5)
SODIUM SERPL-SCNC: 136 MMOL/L — SIGNIFICANT CHANGE UP (ref 135–145)
WBC # BLD: 8.3 K/UL — SIGNIFICANT CHANGE UP (ref 3.8–10.5)
WBC # FLD AUTO: 8.3 K/UL — SIGNIFICANT CHANGE UP (ref 3.8–10.5)

## 2020-11-10 PROCEDURE — 86901 BLOOD TYPING SEROLOGIC RH(D): CPT

## 2020-11-10 PROCEDURE — 85025 COMPLETE CBC W/AUTO DIFF WBC: CPT

## 2020-11-10 PROCEDURE — 86850 RBC ANTIBODY SCREEN: CPT

## 2020-11-10 PROCEDURE — 74018 RADEX ABDOMEN 1 VIEW: CPT | Mod: 26

## 2020-11-10 PROCEDURE — 86900 BLOOD TYPING SEROLOGIC ABO: CPT

## 2020-11-10 PROCEDURE — C1713: CPT

## 2020-11-10 PROCEDURE — U0003: CPT

## 2020-11-10 PROCEDURE — 82652 VIT D 1 25-DIHYDROXY: CPT

## 2020-11-10 PROCEDURE — 73552 X-RAY EXAM OF FEMUR 2/>: CPT

## 2020-11-10 PROCEDURE — 93005 ELECTROCARDIOGRAM TRACING: CPT

## 2020-11-10 PROCEDURE — 80074 ACUTE HEPATITIS PANEL: CPT

## 2020-11-10 PROCEDURE — 80048 BASIC METABOLIC PNL TOTAL CA: CPT

## 2020-11-10 PROCEDURE — 73502 X-RAY EXAM HIP UNI 2-3 VIEWS: CPT

## 2020-11-10 PROCEDURE — 74019 RADEX ABDOMEN 2 VIEWS: CPT

## 2020-11-10 PROCEDURE — 36415 COLL VENOUS BLD VENIPUNCTURE: CPT

## 2020-11-10 PROCEDURE — 85027 COMPLETE CBC AUTOMATED: CPT

## 2020-11-10 PROCEDURE — 81001 URINALYSIS AUTO W/SCOPE: CPT

## 2020-11-10 PROCEDURE — 99285 EMERGENCY DEPT VISIT HI MDM: CPT | Mod: 25

## 2020-11-10 PROCEDURE — 97161 PT EVAL LOW COMPLEX 20 MIN: CPT

## 2020-11-10 PROCEDURE — 74018 RADEX ABDOMEN 1 VIEW: CPT

## 2020-11-10 PROCEDURE — 97530 THERAPEUTIC ACTIVITIES: CPT

## 2020-11-10 PROCEDURE — 93975 VASCULAR STUDY: CPT

## 2020-11-10 PROCEDURE — 76705 ECHO EXAM OF ABDOMEN: CPT

## 2020-11-10 PROCEDURE — 85610 PROTHROMBIN TIME: CPT

## 2020-11-10 PROCEDURE — 71045 X-RAY EXAM CHEST 1 VIEW: CPT

## 2020-11-10 PROCEDURE — 80053 COMPREHEN METABOLIC PANEL: CPT

## 2020-11-10 PROCEDURE — 80076 HEPATIC FUNCTION PANEL: CPT

## 2020-11-10 PROCEDURE — 99232 SBSQ HOSP IP/OBS MODERATE 35: CPT | Mod: GC

## 2020-11-10 PROCEDURE — 85730 THROMBOPLASTIN TIME PARTIAL: CPT

## 2020-11-10 PROCEDURE — C1776: CPT

## 2020-11-10 PROCEDURE — 72170 X-RAY EXAM OF PELVIS: CPT

## 2020-11-10 PROCEDURE — 97116 GAIT TRAINING THERAPY: CPT

## 2020-11-10 PROCEDURE — 82248 BILIRUBIN DIRECT: CPT

## 2020-11-10 PROCEDURE — 83735 ASSAY OF MAGNESIUM: CPT

## 2020-11-10 PROCEDURE — 82977 ASSAY OF GGT: CPT

## 2020-11-10 RX ORDER — SENNA PLUS 8.6 MG/1
2 TABLET ORAL
Qty: 0 | Refills: 0 | DISCHARGE
Start: 2020-11-10

## 2020-11-10 RX ORDER — LACTULOSE 10 G/15ML
15 SOLUTION ORAL
Qty: 0 | Refills: 0 | DISCHARGE
Start: 2020-11-10

## 2020-11-10 RX ORDER — POLYETHYLENE GLYCOL 3350 17 G/17G
17 POWDER, FOR SOLUTION ORAL
Qty: 0 | Refills: 0 | DISCHARGE
Start: 2020-11-10

## 2020-11-10 RX ORDER — OXYCODONE HYDROCHLORIDE 5 MG/1
1 TABLET ORAL
Qty: 0 | Refills: 0 | DISCHARGE
Start: 2020-11-10

## 2020-11-10 RX ORDER — ASPIRIN/CALCIUM CARB/MAGNESIUM 324 MG
1 TABLET ORAL
Qty: 0 | Refills: 0 | DISCHARGE
Start: 2020-11-10

## 2020-11-10 RX ORDER — SACCHAROMYCES BOULARDII 250 MG
1 POWDER IN PACKET (EA) ORAL
Qty: 0 | Refills: 0 | DISCHARGE
Start: 2020-11-10

## 2020-11-10 RX ORDER — MINERAL OIL
30 OIL (ML) MISCELLANEOUS
Qty: 0 | Refills: 0 | DISCHARGE
Start: 2020-11-10

## 2020-11-10 RX ORDER — POTASSIUM CHLORIDE 20 MEQ
40 PACKET (EA) ORAL EVERY 4 HOURS
Refills: 0 | Status: DISCONTINUED | OUTPATIENT
Start: 2020-11-10 | End: 2020-11-10

## 2020-11-10 RX ORDER — CELECOXIB 200 MG/1
1 CAPSULE ORAL
Qty: 0 | Refills: 0 | DISCHARGE
Start: 2020-11-10

## 2020-11-10 RX ORDER — PANTOPRAZOLE SODIUM 20 MG/1
1 TABLET, DELAYED RELEASE ORAL
Qty: 0 | Refills: 0 | DISCHARGE
Start: 2020-11-10

## 2020-11-10 RX ADMIN — CELECOXIB 200 MILLIGRAM(S): 200 CAPSULE ORAL at 08:07

## 2020-11-10 RX ADMIN — POLYETHYLENE GLYCOL 3350 17 GRAM(S): 17 POWDER, FOR SOLUTION ORAL at 07:08

## 2020-11-10 RX ADMIN — Medication 12.5 MILLIGRAM(S): at 07:07

## 2020-11-10 RX ADMIN — Medication 2 TABLET(S): at 11:46

## 2020-11-10 RX ADMIN — MORPHINE SULFATE 2 MILLIGRAM(S): 50 CAPSULE, EXTENDED RELEASE ORAL at 11:46

## 2020-11-10 RX ADMIN — CELECOXIB 200 MILLIGRAM(S): 200 CAPSULE ORAL at 07:07

## 2020-11-10 RX ADMIN — MORPHINE SULFATE 2 MILLIGRAM(S): 50 CAPSULE, EXTENDED RELEASE ORAL at 00:57

## 2020-11-10 RX ADMIN — Medication 40 MILLIEQUIVALENT(S): at 11:47

## 2020-11-10 RX ADMIN — Medication 5 MILLILITER(S): at 13:15

## 2020-11-10 RX ADMIN — AMLODIPINE BESYLATE 2.5 MILLIGRAM(S): 2.5 TABLET ORAL at 07:07

## 2020-11-10 RX ADMIN — IRBESARTAN 150 MILLIGRAM(S): 75 TABLET ORAL at 07:07

## 2020-11-10 RX ADMIN — Medication 5 MILLILITER(S): at 03:56

## 2020-11-10 RX ADMIN — Medication 250 MILLIGRAM(S): at 07:07

## 2020-11-10 NOTE — PROGRESS NOTE ADULT - ASSESSMENT
89 year old F w/ PMH of HTN, osteoporosis p/w L femoral neck fracture s/p L IM nail, course c/b transaminitis of unclear etiology, now downtrending. Post-op c/o pain and constipation.     1-Post-op care: c/w pain control, minimize opioid use, c/w aggressive bowel regimen (miralax, lactulose, enemas), c/w IS. DVT ppx with ASA BID. PT --> RONNY.    2- Transaminitis: downtrending,, likely drug induced, stabilized     3- Anemia: acute blood loss 2/2 surgery, stable    4- Hyponatremia: asymptomatic, improved. Pre-renal vs SIADH. Continue to monitor.    5-HTN: c/w irbesartan, norvasc (she was also on losartan for unclear reasons, no indication for two ARBs), HCTZ resumed yesterday.    6- Hypokalemia: being replaced, f/up K level in AM, check Mg level.    7- Moderate protein calorie malnutrition: evaluated by Nutrition, apprec recs.

## 2020-11-10 NOTE — PROGRESS NOTE ADULT - SUBJECTIVE AND OBJECTIVE BOX
Orthopaedic Surgery Progress Note    Seen and examined. Doing well but complains of gas pains. Had BM. Denies CP/SOB/numbness, weakness.      Objective:    Vital Signs Last 24 Hrs  T(C): 36.6 (10 Nov 2020 08:57), Max: 36.9 (09 Nov 2020 20:45)  T(F): 97.9 (10 Nov 2020 08:57), Max: 98.4 (09 Nov 2020 20:45)  HR: 90 (10 Nov 2020 08:57) (72 - 96)  BP: 144/68 (10 Nov 2020 08:57) (144/68 - 168/85)  BP(mean): --  RR: 18 (10 Nov 2020 08:57) (16 - 18)  SpO2: 100% (10 Nov 2020 08:57) (99% - 100%)  AVSS    PE:  General: Patient alert and oriented, NAD  Abdomen nontender, nondistended  Dressing: Clean/dry/intact  Pulses: 2+ DP BLE   Sensation: SILT BLE   Motor: EHL/FHL/TA/GS 5/5 BLE                     A/P: 89yo Female POD#7 s/p L SHAYAN  1. Pain control as needed - dc narcotics  2. DVT prophylaxis: ASA   3. PT, weight-bearing status: PWB LLE  4. Laxatives PRN  5. Appreciate nutrition recs    6. Dispo: RONNY    Ortho Pager [phone]

## 2020-11-10 NOTE — PROGRESS NOTE ADULT - NUTRITIONAL ASSESSMENT
This patient has been assessed with a concern for Malnutrition and has been determined to have a diagnosis/diagnoses of Moderate protein-calorie malnutrition.    This patient is being managed with:   Diet Regular-  Supplement Feeding Modality:  Oral  Ensure Pudding Cans or Servings Per Day:  1       Frequency:  Three Times a day  Entered: Nov 9 2020  5:28PM    Diet Regular-  Entered: Nov 5 2020  2:17PM    The following pending diet order is being considered for treatment of Moderate protein-calorie malnutrition:null

## 2020-11-10 NOTE — DISCHARGE NOTE NURSING/CASE MANAGEMENT/SOCIAL WORK - PATIENT PORTAL LINK FT
You can access the FollowMyHealth Patient Portal offered by [facility] by registering at the following website: [email or web address]. By joining [facility]’s FollowMyHealth portal, you will also be able to view your health information using other applications (apps) compatible with our system.

## 2020-11-20 ENCOUNTER — TRANSCRIPTION ENCOUNTER (OUTPATIENT)
Age: 85
End: 2020-11-20

## 2020-12-08 ENCOUNTER — APPOINTMENT (OUTPATIENT)
Dept: ORTHOPEDIC SURGERY | Facility: CLINIC | Age: 85
End: 2020-12-08
Payer: MEDICARE

## 2020-12-08 ENCOUNTER — OUTPATIENT (OUTPATIENT)
Dept: OUTPATIENT SERVICES | Facility: HOSPITAL | Age: 85
LOS: 1 days | End: 2020-12-08
Payer: MEDICARE

## 2020-12-08 VITALS — TEMPERATURE: 96.6 F

## 2020-12-08 DIAGNOSIS — Z98.890 OTHER SPECIFIED POSTPROCEDURAL STATES: Chronic | ICD-10-CM

## 2020-12-08 DIAGNOSIS — Z90.49 ACQUIRED ABSENCE OF OTHER SPECIFIED PARTS OF DIGESTIVE TRACT: Chronic | ICD-10-CM

## 2020-12-08 PROCEDURE — 73502 X-RAY EXAM HIP UNI 2-3 VIEWS: CPT | Mod: 26,LT

## 2020-12-08 PROCEDURE — 99024 POSTOP FOLLOW-UP VISIT: CPT

## 2020-12-08 PROCEDURE — 73502 X-RAY EXAM HIP UNI 2-3 VIEWS: CPT

## 2020-12-11 NOTE — ASSESSMENT
[FreeTextEntry1] : Assessment\par S/p LTHA on 11/05/2020, doing great. Patient can discontinue weight bearing restrictions and precautions at 5 weeks. She should continue physical therapy with no restrictions and gradually wean from roller to walker to no assistive devices as tolerated.\par \par F/u in 6 weeks\par \par All medical record entries made by the PA/Scribe/Fellow are at my, Dr. Cordell Rodríguez's direction and personally dictated by me on 12/08/2020]. I have reviewed the chart and agree that the record accurately reflects my personal performance of the history, physical exam, assessment, and plan. I have also personally directed reviewed, and agreed with the chart.\par \par \par

## 2020-12-11 NOTE — PHYSICAL EXAM
[de-identified] : General: Not in acute distress, dressed appropriately, sitting on examination table\par Skin: Warm and dry, normal turgor, no rashes\par Neurological: AOx3, Cranial nerves grossly in tact\par Psych: Mood and affect appropriate\par \par Left Hip: Well healed surgical incision. No swelling edema erythema redness or drainage. Nontender. ROM: Not assessed. 5/5 strength. Antalgic gait. Ambulates with a wheeled walker. NVID.\par \par  [de-identified] : Left total hip arthroplasty in good position with no signs of fracture, subsidence, loosening; unchanged from prior film on 11/05/2020.

## 2020-12-11 NOTE — HISTORY OF PRESENT ILLNESS
[de-identified] : 89 y/o F, s/p LTHA on 11/5/2020 doing well.  Patient has been compliant with 50% weight restriction, she continues to take Aspirin regularly and uses a walker for ambulation. She states pain is intermittent and well controlled, she takes no anti-inflammatory or pain meds currently. Pt denies fevers, chills, SOB, chest pain.\par

## 2021-02-10 ENCOUNTER — APPOINTMENT (OUTPATIENT)
Dept: ORTHOPEDIC SURGERY | Facility: CLINIC | Age: 86
End: 2021-02-10
Payer: MEDICARE

## 2021-02-10 VITALS — TEMPERATURE: 96.6 F

## 2021-02-10 PROCEDURE — 99213 OFFICE O/P EST LOW 20 MIN: CPT

## 2021-02-13 NOTE — ASSESSMENT
[FreeTextEntry1] : Assessment: \par s/p left SHAYAN 11/05/2020, doing well overall\par \par Plan:\par Start physical therapy and home exercises demonstrated and reviewed \par Continue to use cane until she feels strong and stable \par \par F/U 6 weeks. \par All medical record entries made by the PA/Samanthaibe/Fellow are at my, Dr. Cordell Rodríguez's direction and personally dictated by me on 02/10/2021]. I have reviewed the chart and agree that the record accurately reflects my personal performance of the history, physical exam, assessment, and plan. I have also personally directed reviewed, and agreed with the chart.\par

## 2021-02-13 NOTE — HISTORY OF PRESENT ILLNESS
[de-identified] : 89 y/o F, s/p left SHAYAN on 11/5/2020 doing well overall but does have mild posterior hip soreness. She is currently ambulating with a cane and has decent balance. Not taking any pain medications. No recent physical therapy. She would like to know if she can discontinue her cane.

## 2021-02-13 NOTE — PHYSICAL EXAM
[de-identified] : General: Not in acute distress, dressed appropriately, sitting on examination table\par Skin: Warm and dry, normal turgor, no rashes\par Neurological: AOx3, Cranial nerves grossly in tact\par Psych: Mood and affect appropriate\par \par Left Hip: Well healed surgical incision. No swelling edema erythema redness or drainage. Nontender. No tenderness over greater trochanter. FROM. 5/5 strength. Antalgic gait. Ambulates  with cane. DVNI.\par  [de-identified] : No imaging today.

## 2021-02-13 NOTE — END OF VISIT
[FreeTextEntry3] : By signing my name below, I, Celeste Meza, attest that this documentation has been prepared under the direction and in the presence of Nuria Loco MD.

## 2021-04-16 ENCOUNTER — APPOINTMENT (OUTPATIENT)
Dept: VASCULAR SURGERY | Facility: CLINIC | Age: 86
End: 2021-04-16

## 2021-04-27 ENCOUNTER — OUTPATIENT (OUTPATIENT)
Dept: OUTPATIENT SERVICES | Facility: HOSPITAL | Age: 86
LOS: 1 days | End: 2021-04-27
Payer: MEDICARE

## 2021-04-27 ENCOUNTER — RESULT REVIEW (OUTPATIENT)
Age: 86
End: 2021-04-27

## 2021-04-27 ENCOUNTER — APPOINTMENT (OUTPATIENT)
Dept: ORTHOPEDIC SURGERY | Facility: CLINIC | Age: 86
End: 2021-04-27
Payer: MEDICARE

## 2021-04-27 DIAGNOSIS — Z98.890 OTHER SPECIFIED POSTPROCEDURAL STATES: Chronic | ICD-10-CM

## 2021-04-27 DIAGNOSIS — Z90.49 ACQUIRED ABSENCE OF OTHER SPECIFIED PARTS OF DIGESTIVE TRACT: Chronic | ICD-10-CM

## 2021-04-27 PROCEDURE — 73502 X-RAY EXAM HIP UNI 2-3 VIEWS: CPT | Mod: 26,LT

## 2021-04-27 PROCEDURE — 99212 OFFICE O/P EST SF 10 MIN: CPT

## 2021-04-27 PROCEDURE — 73502 X-RAY EXAM HIP UNI 2-3 VIEWS: CPT

## 2021-05-02 NOTE — ASSESSMENT
[FreeTextEntry1] : Assessment:\par s/p left SHAYAN on 11/05/2020 with improvement\par \par Plan:\par Continue physical therapy\par \par F/U 3 weeks in office\par All medical record entries made by the PA/Margarita/Fellow are at my, Dr. Cordell Rodríguez's direction and personally dictated by me on 04/27/2021]. I have reviewed the chart and agree that the record accurately reflects my personal performance of the history, physical exam, assessment, and plan. I have also personally directed reviewed, and agreed with the chart.\par \par

## 2021-05-02 NOTE — PHYSICAL EXAM
[de-identified] : General: Not in acute distress, dressed appropriately, sitting on examination table\par Skin: Warm and dry, normal turgor, no rashes\par Neurological: AOx3, Cranial nerves grossly in tact\par Psych: Mood and affect appropriate\par \par Left Hip: Well healed surgical incision. No swelling edema erythema redness or drainage. Nontender. No tenderness over greater trochanter. FROM. 5/5 strength. Antalgic gait. Ambulates with cane. DVNI.\par  [de-identified] : Xray of left hip shows arthroplasty in normal alignment. No signs of loosening.

## 2021-05-02 NOTE — HISTORY OF PRESENT ILLNESS
[de-identified] : 89 y/o F s/p left SHAYAN on 11/05/2020. Reports slow improvement. She has been attending physical therapy with improvement to ambulation and movement.She endorses occasional pain with ambulation. Pt continues to ambulate with a cane.

## 2021-05-02 NOTE — END OF VISIT
[FreeTextEntry3] : By signing my name below, I, Amarilis Cook, attest that this documentation has been prepared under the direction and in the presence of Harpreet Lubin PA-C.

## 2021-05-26 ENCOUNTER — APPOINTMENT (OUTPATIENT)
Dept: ORTHOPEDIC SURGERY | Facility: CLINIC | Age: 86
End: 2021-05-26
Payer: MEDICARE

## 2021-05-26 DIAGNOSIS — Z96.642 AFTERCARE FOLLOWING JOINT REPLACEMENT SURGERY: ICD-10-CM

## 2021-05-26 DIAGNOSIS — M54.5 LOW BACK PAIN: ICD-10-CM

## 2021-05-26 DIAGNOSIS — Z47.1 AFTERCARE FOLLOWING JOINT REPLACEMENT SURGERY: ICD-10-CM

## 2021-05-26 PROCEDURE — 99212 OFFICE O/P EST SF 10 MIN: CPT

## 2021-05-26 NOTE — ASSESSMENT
[FreeTextEntry1] : Assessment:\par s/p left SHAYAN on 11/05/2020 with improvement\par Lower back pain \par \par Plan:\par Pause physical therapy for now. Do home exercises. \par Follow up with Dr. Swann for lumbar evaluation \par \par F/U 3 months. \par

## 2021-05-26 NOTE — PHYSICAL EXAM
[de-identified] : General: Not in acute distress, dressed appropriately, sitting on examination table\par Skin: Warm and dry, normal turgor, no rashes\par Neurological: AOx3, Cranial nerves grossly in tact\par Psych: Mood and affect appropriate\par \par Left Hip: Well healed surgical incision. No swelling edema erythema redness or drainage. Nontender. No tenderness over greater trochanter. FROM. 5/5 strength. Antalgic gait. Ambulates with cane. DVNI. [de-identified] : No imaging today.

## 2021-05-26 NOTE — HISTORY OF PRESENT ILLNESS
[de-identified] : 89 y/o F s/p left SHAYAN on 11/05/2020 and here today for follow up. She has been walking well with a cane. Has some lateral tenderness and low back pain.

## 2021-05-26 NOTE — END OF VISIT
[FreeTextEntry3] : By signing my name below, I, Celeste Meza, attest that this documentation has been prepared under the direction and in the presence of Wai Bennett PA-C.

## 2021-06-25 ENCOUNTER — APPOINTMENT (OUTPATIENT)
Dept: VASCULAR SURGERY | Facility: CLINIC | Age: 86
End: 2021-06-25
Payer: MEDICARE

## 2021-06-25 PROCEDURE — 99213 OFFICE O/P EST LOW 20 MIN: CPT

## 2021-06-25 NOTE — PROCEDURE
[FreeTextEntry3] : Procedure performed: Sclerotherapy bilateral lower extremities\par \par Indications: spider veins\par \par Procedure: Consent was obtained. Patient was placed in comfortable position, legs were prepped with alcohol, 1% sotradecal mixed with normal saline was injected into the veins and pressure dressing with cotton balls and tape was applied. Patient tolerated the procedure well. Legs were wrapped with ACE bandage, patient advised to leave bandage on for 24 hours. FU in 2-3 weeks. 
no chills/no fever

## 2021-06-25 NOTE — ADDENDUM
[FreeTextEntry1] : This note was written by Della Gallegos NP acting as scribe for Dr.Gary uEfemia RAMESH.\par \par I, Dr. Quan Fall  have read and attest that all the information, medical decision making and discharge instructions within are true and accurate.

## 2021-08-20 ENCOUNTER — APPOINTMENT (OUTPATIENT)
Dept: VASCULAR SURGERY | Facility: CLINIC | Age: 86
End: 2021-08-20

## 2021-10-01 ENCOUNTER — APPOINTMENT (OUTPATIENT)
Dept: VASCULAR SURGERY | Facility: CLINIC | Age: 86
End: 2021-10-01

## 2021-10-12 NOTE — CONSULT NOTE ADULT - SUBJECTIVE AND OBJECTIVE BOX
[FreeTextEntry1] : 33F h/o NELA p/w initial evaluation for hepatitis B infection.\par \par Most likely chronic hepatitis B infection.  Patient is asymptomatic.  Unclear phase.  She likely acquired when she was in [place].  \par \par - Will get initial labs and imaging to determine the phase of HBV\par - RTC 2 weeks to discuss the lab/imaging results and the plan \par \par 
89F well known to Dr. 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 Patient does have pain at her surgical site but denies pain in her distal lower extremities. She denies HA, lightheadedness, N/V, CP, SOB, abdominal pain, diarrhea, dysuria. She does have some mild constipation likely 2/2 opioids.     Vital Signs Last 24 Hrs  T(C): 36.6 (03 Sep 2020 15:27), Max: 36.7 (03 Sep 2020 11:58)  T(F): 97.8 (03 Sep 2020 15:27), Max: 98 (03 Sep 2020 11:58)  HR: 96 (03 Sep 2020 15:27) (68 - 96)  BP: 123/73 (03 Sep 2020 15:27) (121/67 - 172/74)  BP(mean): 93 (03 Sep 2020 11:58) (93 - 106)  RR: 15 (03 Sep 2020 15:27) (15 - 17)  SpO2: 95% (03 Sep 2020 15:27) (86% - 99%)    General: NAD  Pulm: normal resp effort and excursion  Abd: soft, NT/ND  Ext: WWP bilaterally, no edema, L hip w/ dressing CDI, palpable DP and PT pulses bilaterally                          10.6   9.26  )-----------( 253      ( 03 Sep 2020 11:21 )             31.9

## 2022-03-04 ENCOUNTER — APPOINTMENT (OUTPATIENT)
Dept: VASCULAR SURGERY | Facility: CLINIC | Age: 87
End: 2022-03-04
Payer: MEDICARE

## 2022-03-04 PROCEDURE — 36471 NJX SCLRSNT MLT INCMPTNT VN: CPT

## 2022-03-04 PROCEDURE — 36468 NJX SCLRSNT SPIDER VEINS: CPT

## 2022-03-04 PROCEDURE — 93970 EXTREMITY STUDY: CPT

## 2022-03-04 NOTE — ADDENDUM
[FreeTextEntry1] : This note was written by Della Gallegos NP acting as scribe for Dr.Gary Eufemia RAMESH.\par \par I, Dr. Quan Fall  have read and attest that all the information, medical decision making and discharge instructions within are true and accurate.

## 2022-04-01 ENCOUNTER — APPOINTMENT (OUTPATIENT)
Dept: VASCULAR SURGERY | Facility: CLINIC | Age: 87
End: 2022-04-01
Payer: SELF-PAY

## 2022-04-01 VITALS
SYSTOLIC BLOOD PRESSURE: 160 MMHG | WEIGHT: 110 LBS | HEART RATE: 79 BPM | DIASTOLIC BLOOD PRESSURE: 87 MMHG | HEIGHT: 61 IN | BODY MASS INDEX: 20.77 KG/M2

## 2022-04-01 DIAGNOSIS — I83.93 ASYMPTOMATIC VARICOSE VEINS OF BILATERAL LOWER EXTREMITIES: ICD-10-CM

## 2022-04-01 PROCEDURE — 36468 NJX SCLRSNT SPIDER VEINS: CPT

## 2022-05-03 NOTE — PHYSICAL THERAPY INITIAL EVALUATION ADULT - CRITERIA FOR SKILLED THERAPEUTIC INTERVENTIONS
No indicators present anticipated discharge recommendation/functional limitations in following categories/anticipated equipment needs at discharge/risk reduction/prevention/impairments found/predicted duration of therapy intervention/therapy frequency/rehab potential

## 2023-07-06 NOTE — PRE-OP CHECKLIST - ANTIBIOTIC
"Subjective   Chief Complaint   Patient presents with    Follow-up     MACKENZIE IS HERE TODAY FOR F/U. PT REPORTS THAT SHE SAW HER CARDIOLOGIST LAST WEEK WHO INSTRUCTED HER TO MAKE AN APT WITH PCP.       Patient ID: Mackenzie Baker is a 74 y.o. female who presents for Follow-up (MACKENZIE IS HERE TODAY FOR F/U. PT REPORTS THAT SHE SAW HER CARDIOLOGIST LAST WEEK WHO INSTRUCTED HER TO MAKE AN APT WITH PCP.).    HPI  73 yo est female presents today for concerns about her memory    PMH: OSTEOPOROSIS, HTN, ANXIETY/DEPRESSION, LE EDEMA, GERD, CHRONIC SHOULDER PAIN, ECZEMA, HFpEF      Pt was seen by cardiology last month and expressed concerns about memory issues, ? TIA   Pt feels her medication (?) is causing memory issues   Cardiology ordered US carotid and Holter monitor x 2 weeks  Carotid US ----> < 50% blockage noted b/l     Pt states \"I was talking to my friend the other day and I couldn't remember who I was talking to or what we were talking about\"---> states about 1 hour later she remembered who she was talking to  Pt states she will go to use her microwave and forget how it works  Pt denies headaches  Pt denies vision changes  States her mother is alive (98 yo) and was dx'd with vascular dementia at age 88    Review of Systems  All 13 systems were reviewed and are within normal limits except positive and pertinent negative responses which are noted below or in HPI.      Objective   /85   Pulse 84   Wt 64.9 kg (143 lb)   BMI 25.33 kg/m²        Physical Exam  Vitals reviewed.   Constitutional:       General: She is not in acute distress.  Cardiovascular:      Pulses: Normal pulses.   Pulmonary:      Effort: Pulmonary effort is normal.   Skin:     Capillary Refill: Capillary refill takes less than 2 seconds.   Neurological:      Mental Status: She is alert.   Psychiatric:         Mood and Affect: Mood normal.         Behavior: Behavior normal.         Thought Content: Thought content normal.         Judgment: " Judgment normal.      Comments: SLUMS= 26/30         Assessment/Plan   Problem List Items Addressed This Visit       Chronic pain of both shoulders    Relevant Medications    traMADol (Ultram) 50 mg tablet    diclofenac sodium 1 % kit    naloxone (Narcan) 4 mg/0.1 mL nasal spray     Other Visit Diagnoses       Mild cognitive impairment    -  Primary    Relevant Orders    Referral to Neurology    CT head wo IV contrast             n/a

## 2023-12-08 NOTE — PROGRESS NOTE ADULT - SUBJECTIVE AND OBJECTIVE BOX
pt having BM today  no nausea or vomiting  no sob/cp     MEDICATIONS  (STANDING):  amLODIPine   Tablet 2.5 milliGRAM(s) Oral <User Schedule>  aspirin enteric coated 81 milliGRAM(s) Oral two times a day  calcium carbonate    500 mG (Tums) Chewable 2 Tablet(s) Chew daily  calcium carbonate 1250 mG  + Vitamin D (OsCal 500 + D) 1 Tablet(s) Oral three times a day  celecoxib 200 milliGRAM(s) Oral two times a day  hydrochlorothiazide 12.5 milliGRAM(s) Oral daily  irbesartan 150 milliGRAM(s) Oral daily  lactated ringers. 1000 milliLiter(s) (100 mL/Hr) IV Continuous <Continuous>  pantoprazole    Tablet 40 milliGRAM(s) Oral before breakfast  polyethylene glycol 3350 17 Gram(s) Oral two times a day  potassium chloride   Powder 40 milliEquivalent(s) Oral every 4 hours  povidone iodine 10% Solution 1 Application(s) Topical once  povidone iodine 5% Nasal Swab 1 Application(s) Both Nostrils once  saccharomyces boulardii 250 milliGRAM(s) Oral two times a day    MEDICATIONS  (PRN):  aluminum hydroxide/magnesium hydroxide/simethicone Suspension 30 milliLiter(s) Oral four times a day PRN Indigestion  artificial tears (preservative free) Ophthalmic Solution 1 Drop(s) Both EYES three times a day PRN Dry Eyes  Biotene Dry Mouth Oral Rinse 5 milliLiter(s) Swish and Spit four times a day PRN dry mouth  bisacodyl Suppository 10 milliGRAM(s) Rectal daily PRN If no bowel movement by POD#2  lactulose Syrup 10 Gram(s) Oral daily PRN constipation  magnesium hydroxide Suspension 30 milliLiter(s) Oral daily PRN Constipation  metoclopramide Injectable 10 milliGRAM(s) IV Push every 6 hours PRN Nausea and/or Vomiting, second line  mineral oil 30 milliLiter(s) Oral daily PRN constipation  morphine  - Injectable 2 milliGRAM(s) IV Push every 4 hours PRN breakthrough pain  ondansetron Injectable 4 milliGRAM(s) IV Push every 6 hours PRN Nausea and/or Vomiting  oxyCODONE    IR 10 milliGRAM(s) Oral every 4 hours PRN Severe Pain (7 - 10)  oxyCODONE    IR 5 milliGRAM(s) Oral every 4 hours PRN Moderate Pain (4 - 6)  senna 2 Tablet(s) Oral at bedtime PRN Constipation      Allergies    carvedilol (Unknown)  codeine (Unknown)  conjugated estrogens (Unknown)  doxycycline (Unknown)  Levaquin (Unknown)  lubiprostone (Unknown)  nitrofurantoin (Unknown)  nortriptyline (Unknown)  penicillin (Unknown)  Tylenol (Rash)    Intolerances          Vital Signs Last 24 Hrs  T(C): 36.9 (09 Nov 2020 20:45), Max: 37.3 (09 Nov 2020 08:45)  T(F): 98.4 (09 Nov 2020 20:45), Max: 99.1 (09 Nov 2020 08:45)  HR: 96 (09 Nov 2020 20:45) (72 - 96)  BP: 168/85 (09 Nov 2020 20:45) (135/78 - 168/85)  BP(mean): --  RR: 17 (09 Nov 2020 20:45) (16 - 17)  SpO2: 99% (09 Nov 2020 20:45) (93% - 100%)  CAPILLARY BLOOD GLUCOSE          11-08 @ 07:01  -  11-09 @ 07:00  --------------------------------------------------------  IN: 820 mL / OUT: 700 mL / NET: 120 mL    11-09 @ 07:01  -  11-09 @ 21:53  --------------------------------------------------------  IN: 1200 mL / OUT: 1000 mL / NET: 200 mL        Physical Exam:    Daily     Daily   General:  Well appearing, NAD, not cachetic  HEENT:  Nonicteric, PERRLA  CV:  RRR, S1S2 ok  Lungs:  CTA B/L, no wheezes, rales, rhonchi  Abdomen:  Soft, non-tender, no distended, positive BS, no hepatosplenomegaly  Extremities:  no edema. Left hip dressing on place, C/D/I  Skin:  Warm and dry, no rashes  :  No barrios  Neuro:  AAOx3, non-focal, CN II-XII grossly intact  No Restraints    LABS:                        9.6    8.39  )-----------( 432      ( 09 Nov 2020 11:13 )             30.1     11-09    133<L>  |  92<L>  |  7   ----------------------------<  161<H>  3.0<L>   |  32<H>  |  0.50    Ca    8.8      09 Nov 2020 11:13    TPro  5.1<L>  /  Alb  2.5<L>  /  TBili  0.3  /  DBili  x   /  AST  89<H>  /  ALT  151<H>  /  AlkPhos  227<H>  11-08            RADIOLOGY & ADDITIONAL TESTS:   20

## 2025-02-20 NOTE — DIETITIAN INITIAL EVALUATION ADULT. - PHYSICAL ASSESSMENT SCAPULA
----- Message from SABINO Meneses sent at 2/20/2025  3:21 PM CST -----  Please call patient and report no acute fracture noted in her shoulder.  She does have degenerative changes which can be related to osteoarthritis.  There was noted in the x-ray that based on the location of her humeral head there is a possibility of   rotator cuff injury.  I am going to place referral to orthopedics so that patient can follow-up but otherwise she should continue with the measures we discussed at the visit.   moderate